# Patient Record
Sex: FEMALE | Race: WHITE
[De-identification: names, ages, dates, MRNs, and addresses within clinical notes are randomized per-mention and may not be internally consistent; named-entity substitution may affect disease eponyms.]

---

## 2017-06-30 ENCOUNTER — HOSPITAL ENCOUNTER (EMERGENCY)
Dept: HOSPITAL 33 - ED | Age: 37
Discharge: LEFT BEFORE DISCHARGE | End: 2017-06-30
Payer: SELF-PAY

## 2017-06-30 VITALS — OXYGEN SATURATION: 100 %

## 2017-06-30 VITALS — DIASTOLIC BLOOD PRESSURE: 76 MMHG | SYSTOLIC BLOOD PRESSURE: 138 MMHG | HEART RATE: 92 BPM

## 2017-06-30 DIAGNOSIS — R07.89: Primary | ICD-10-CM

## 2017-06-30 DIAGNOSIS — E78.00: ICD-10-CM

## 2017-06-30 DIAGNOSIS — Z79.84: ICD-10-CM

## 2017-06-30 DIAGNOSIS — R20.0: ICD-10-CM

## 2017-06-30 DIAGNOSIS — F45.8: ICD-10-CM

## 2017-06-30 DIAGNOSIS — E11.9: ICD-10-CM

## 2017-06-30 LAB
ALBUMIN SERPL-MCNC: 3.8 G/DL (ref 3.4–5)
ALP SERPL-CCNC: 87 U/L (ref 46–116)
ALT SERPL-CCNC: 48 U/L (ref 12–78)
ANION GAP SERPL CALC-SCNC: 19 MEQ/L (ref 5–15)
ARTERIAL PATENCY WRIST A: YES
AST SERPL QL: 65 U/L (ref 15–37)
BASE EXCESS BLDA CALC-SCNC: -0.6 MMOL/L (ref -2–2)
BASOPHILS NFR BLD AUTO: 0.4 % (ref 0–0.4)
BILIRUB BLD-MCNC: 0.5 MG/DL (ref 0.2–1)
BUN SERPL-MCNC: 11 MG/DL (ref 9–20)
CHLORIDE SERPL-SCNC: 103 MEQ/L (ref 98–107)
CO2 SERPL-SCNC: 19.8 MEQ/L (ref 21–32)
GLUCOSE SERPL-MCNC: 272 MG/DL (ref 70–110)
INHALED O2 CONCENTRATION: 21 %
INR PPP: 0.97 (ref 0.8–3)
MCH RBC QN AUTO: 30.8 PG (ref 26–32)
NEUTROPHILS NFR BLD AUTO: 59.8 % (ref 36–66)
O2 A-A PPRESDIFF RESPIRATORY: 5 MM[HG]
PLATELET # BLD AUTO: 218 K/MM3 (ref 150–450)
PO2 BLDA: 120 MMHG (ref 75–100)
POTASSIUM SERPLBLD-SCNC: 3.8 MEQ/L (ref 3.5–5.1)
PROT SERPL-MCNC: 7.8 GM/DL (ref 6.4–8.2)
PROTHROMBIN TIME: 11 SECONDS (ref 9.95–12.35)
RBC # BLD AUTO: 4.87 M/MM3 (ref 4.1–5.4)
SAO2 % BLDA: 99.8 % (ref 95–100)
SODIUM SERPL-SCNC: 138 MEQ/L (ref 136–145)
WBC # BLD AUTO: 8 K/MM3 (ref 4–10.5)

## 2017-06-30 PROCEDURE — 71010: CPT

## 2017-06-30 PROCEDURE — 96375 TX/PRO/DX INJ NEW DRUG ADDON: CPT

## 2017-06-30 PROCEDURE — 99284 EMERGENCY DEPT VISIT MOD MDM: CPT

## 2017-06-30 PROCEDURE — 82803 BLOOD GASES ANY COMBINATION: CPT

## 2017-06-30 PROCEDURE — 93041 RHYTHM ECG TRACING: CPT

## 2017-06-30 PROCEDURE — 96374 THER/PROPH/DIAG INJ IV PUSH: CPT

## 2017-06-30 PROCEDURE — 36600 WITHDRAWAL OF ARTERIAL BLOOD: CPT

## 2017-06-30 PROCEDURE — 82375 ASSAY CARBOXYHB QUANT: CPT

## 2017-06-30 PROCEDURE — 36415 COLL VENOUS BLD VENIPUNCTURE: CPT

## 2017-06-30 PROCEDURE — 93005 ELECTROCARDIOGRAM TRACING: CPT

## 2017-06-30 PROCEDURE — 85610 PROTHROMBIN TIME: CPT

## 2017-06-30 PROCEDURE — 84484 ASSAY OF TROPONIN QUANT: CPT

## 2017-06-30 PROCEDURE — 96376 TX/PRO/DX INJ SAME DRUG ADON: CPT

## 2017-06-30 PROCEDURE — 36000 PLACE NEEDLE IN VEIN: CPT

## 2017-06-30 PROCEDURE — 85379 FIBRIN DEGRADATION QUANT: CPT

## 2017-06-30 PROCEDURE — 96360 HYDRATION IV INFUSION INIT: CPT

## 2017-06-30 PROCEDURE — 85025 COMPLETE CBC W/AUTO DIFF WBC: CPT

## 2017-06-30 PROCEDURE — 80053 COMPREHEN METABOLIC PANEL: CPT

## 2017-06-30 NOTE — ERPHSYRPT
- History of Present Illness


Time Seen by Provider: 06/30/17 02:35


Historian: patient


Exam Limitations: clinical condition


Physician History: 





PATIENT WITH HISTORY OF TYPE 2 DIABETES COMPLAINS SHARP PAINS IN CHEST AFTER 

AWAKENING FROM SLEEP ASSOCIATED WITH DIFFICULTY  AND RAPID BREATHING. STATES 

PAIN WORSE UPON INSPIRATION AND MOTION OF TORSO. ADMITSTO LIFTING HEAVY BOXES 

AT WORK SUSTAINING CHEST PAIN EARLLING IN THE DAY. HAS CHRONIC NUMBNESS IN BOTH 

HANDS, AND NEW NUMBNESS AROUND LIPS, FINGERS AND TOES. DENIES FEVER, CHILLS.


Timing/Duration: today


Activities at Onset: none


Quality: sharpness


Location: substernal


Chest Pain Radiation: no radiation


Severity of Pain-Max: moderate


Severity of Pain-Current: moderate


Modifying Factors: Improves With: breathing, change in position


Associated Symptoms: hurts to breathe


Nitro Today/Relief: no nitro taken today


Aspirin Treatment Today: 81 mg x 4, provided by ED


Allergies/Adverse Reactions: 








tomato Allergy (Verified 06/30/17 03:00)


 





Home Medications: 








Metformin HCl [Metformin HCl ER] 500 mg PO BID 12/21/16 [History]





Hx Tetanus, Diphtheria Vaccination/Date Given: Yes


Hx Influenza Vaccination/Date Given: No


Hx Pneumococcal Vaccination/Date Given: No





- Review of Systems


Constitutional: No Fever, No Chills


Eyes: No Symptoms


Ears, Nose, & Throat: No Symptoms


Respiratory: Dyspnea, No Cough


Cardiac: Chest Pain, No Edema, No Syncope


Abdominal/Gastrointestinal: No Symptoms, No Abdominal Pain, No Nausea, No 

Vomiting, No Diarrhea


Genitourinary Symptoms: No Symptoms, No Dysuria


Musculoskeletal: No Symptoms, No Back Pain, No Neck Pain


Skin: No Symptoms, No Rash


Neurological: Parasthesia, No Dizziness, No Focal Weakness, No Sensory Changes


Psychological: No Symptoms


Endocrine: No Symptoms


All Other Systems: Reviewed and Negative





- Past Medical History


Pertinent Past Medical History: Yes


Neurological History: No Pertinent History


ENT History: No Pertinent History


Cardiac History: High Cholesterol


Endocrine Medical History: Diabetes Type II


Musculoskeletal History: No Pertinent History


GI Medical History: No Pertinent History


 History: No Pertinent History


Psycho-Social History: No Pertinent History


Female Reproductive Disorders: No Pertinent History


Other Medical History: CHRONIC HIP PAIN





- Past Surgical History


Past Surgical History: Yes


Neuro Surgical History: No Pertinent History


Respiratory: No Pertinent History


Musculoskeletal: No Pertinent History


Female Surgical History: Tubal Ligation


Other Surgical History: oral surgery





- Social History


Smoking Status: Current every day smoker


How long have you smoked: 20 years


Exposure to second hand smoke: Yes


Drug Use: marijuana


Patient Lives Alone: No


Significant Family History: hypertension (mother)





- Female History


Hx Pregnant Now: No





- Nursing Vital Signs


Nursing Vital Signs: 


 Initial Vital Signs











Temperature                    98.2 F


 


Temperature Source             Oral


 


Pulse Rate                     87


 


Respiratory Rate               18


 


Blood Pressure [Right Arm]     134/76


 


Pain Intensity                 3

















- Physical Exam


General Appearance: mild distress, alert, other (HYPERVENTILATION)


Eye Exam: PERRL/EOMI, eyes nml inspection


Ears, Nose, Throat Exam: normal ENT inspection, moist mucous membranes


Neck Exam: normal inspection, non-tender, supple, full range of motion


Respiratory Exam: normal breath sounds, chest tenderness (PARASTERNAL 

TENDERNESS T-3 TO T-5), lungs clear, No respiratory distress


Cardiovascular Exam: regular rate/rhythm, normal heart sounds


Gastrointestinal/Abdomen Exam: soft, No tenderness, No mass


Back Exam: normal inspection, No CVA tenderness, No vertebral tenderness


Extremity Exam: normal inspection, normal range of motion


Neurologic Exam: alert, oriented x 3, cooperative, normal mood/affect, 

sensation nml, No motor deficits


Skin Exam: normal color, warm, dry


**SpO2 Interpretation**: normal


SpO2: 100


Oxygen Delivery: Room Air





- Course


EKG Interpreted by Me: RATE, NORMAL AXIS, 1st degree AV Block (RATE OF 98)





- Radiology Exams


  ** Chest


X-ray Interpretation: Interpreted by me, Negative, No Infiltrates


Ordered Tests: 


 Active Orders 24 hr











 Category Date Time Status


 


 Cardiac Monitor STAT Care  06/30/17 02:34 Active


 


 EKG-ER Only STAT Care  06/30/17 02:34 Active


 


 IV Insertion STAT Care  06/30/17 02:34 Active


 


 Oxygen-ED Only NASAL CANNULA 2 lpm Care  06/30/17 02:34 Active


 


 CHEST 1 VIEW (PORTABLE) Stat Exams  06/30/17 03:38 Taken


 


 ARTERIAL BLOOD GASES Stat Lab  06/30/17 02:33 Completed


 


 CBC W DIFF Stat Lab  06/30/17 02:40 Completed


 


 CMP Stat Lab  06/30/17 02:40 Completed


 


 D-DIMER QUANTITATION Stat Lab  06/30/17 02:40 Completed


 


 PROTIME WITH INR Stat Lab  06/30/17 02:40 Completed


 


 TROPONIN Q3H Lab  06/30/17 02:40 Completed


 


 TROPONIN Q3H Lab  06/30/17 05:45 Ordered


 


 TROPONIN Q3H Lab  06/30/17 08:45 Ordered


 


 TROPONIN Q3H Lab  06/30/17 11:45 Ordered


 


 TROPONIN Q3H Lab  06/30/17 14:45 Ordered








Medication Summary











Generic Name Dose Route Start Last Admin





  Trade Name Avis  PRN Reason Stop Dose Admin


 


Sodium Chloride  1,000 mls @ 100 mls/hr  06/30/17 02:45  06/30/17 02:56





  Sodium Chloride 0.9% 1000 Ml  IV  07/30/17 02:44  100 mls/hr





  .Q10H ROSLYN   Administration














Discontinued Medications














Generic Name Dose Route Start Last Admin





  Trade Name Avis  PRN Reason Stop Dose Admin


 


Aspirin  324 mg  06/30/17 02:34  06/30/17 02:55





  Baby Aspirin 81 Mg Chew***  PO  06/30/17 02:35  324 mg





  STAT ONE   Administration


 


Aspirin  Confirm  06/30/17 02:54  





  Baby Aspirin 81 Mg Chew***  Administered  06/30/17 02:55  





  Dose   





  324 mg   





  .ROUTE   





  .STK-MED ONE   


 


Fentanyl Citrate  100 mcg  06/30/17 03:31  06/30/17 03:35





  Sublimaze 100 Mcg/2 Ml***  IV  06/30/17 03:32  100 mcg





  STAT ONE   Administration


 


Fentanyl Citrate  Confirm  06/30/17 03:33  





  Sublimaze 100 Mcg/2 Ml***  Administered  06/30/17 03:34  





  Dose   





  100 mcg   





  .ROUTE   





  .STK-MED ONE   


 


Ketorolac Tromethamine  30 mg  06/30/17 03:31  06/30/17 03:37





  Toradol 30 Mg Injection***  IV  06/30/17 03:32  30 mg





  STAT ONE   Administration


 


Ketorolac Tromethamine  Confirm  06/30/17 03:33  





  Toradol 30 Mg Injection***  Administered  06/30/17 03:34  





  Dose   





  30 mg   





  .ROUTE   





  .STK-MED ONE   


 


Lorazepam  2 mg  06/30/17 03:14  06/30/17 03:22





  Ativan 2 Mg/1 Ml Vial***  IV  06/30/17 03:15  2 mg





  STAT ONE   Administration


 


Lorazepam  Confirm  06/30/17 03:20  





  Ativan 2 Mg/1 Ml Vial***  Administered  06/30/17 03:21  





  Dose   





  2 mg   





  .ROUTE   





  .STK-MED ONE   


 


Nitroglycerin  0.4 mg  06/30/17 02:34  06/30/17 02:56





  Nitrostat 0.4 Mg (Ed)***  SL  06/30/17 02:35  0.4 mg





  STAT ONE   Administration


 


Nitroglycerin  Confirm  06/30/17 02:54  





  Nitrostat 0.4 Mg (Ed)***  Administered  06/30/17 02:55  





  Dose   





  0.4 mg   





  SL   





  .STK-MED ONE   


 


Nitroglycerin  1 gm  06/30/17 03:49  





  Nitro-Bid 2% Ud Packets***  TOP  06/30/17 03:50  





  STAT ONE   


 


Ondansetron HCl  4 mg  06/30/17 03:31  06/30/17 03:47





  Zofran 4 Mg/2 Ml Vial**  IV  06/30/17 03:32  4 mg





  STAT ONE   Administration


 


Ondansetron HCl  Confirm  06/30/17 03:47  





  Zofran 4 Mg/2 Ml Vial**  Administered  06/30/17 03:48  





  Dose   





  4 mg   





  .ROUTE   





  .STK-MED ONE   











Lab/Rad Data: 


 Laboratory Result Diagrams





 06/30/17 02:40 





 06/30/17 02:40 





 Laboratory Results











  06/30/17 06/30/17 06/30/17 Range/Units





  02:40 02:40 02:40 


 


WBC     (4.0-10.5)  K/mm3


 


RBC     (4.1-5.4)  M/mm3


 


Hgb     (12.0-16.0)  gm/dl


 


Hct     (35-47)  %


 


MCV     ()  fl


 


MCH     (26-32)  pg


 


MCHC     (32-36)  g/dl


 


RDW     (11.5-14.0)  %


 


Plt Count     (150-450)  K/mm3


 


MPV     (6-9.5)  fl


 


Gran %     (36.0-66.0)  %


 


Lymphocytes %     (24.0-44.0)  %


 


Monocytes %     (0.0-12.0)  %


 


Eosinophils %     (0.00-5.0)  %


 


Basophils %     (0.0-0.4)  %


 


Basophils #     (0-0.4)  


 


INR   0.97   (0.8-3.0)  


 


D-Dimer   387   (0-500)  ng/mL


 


Puncture Site     


 


pCO2     (35-45)  mmHg


 


pO2     ()  mmHg


 


Base Excess     (-2.0-2.0)  


 


O2 Saturation     ()  g/dF


 


ABG pH     (7.35-7.45)  


 


ABG HCO3     (22-28)  


 


ABG O2 Sat (Measured)     ()  %


 


Perez Test     


 


A-a Gradient     


 


a/A Ratio     


 


Hemoglobin     


 


Carboxyhemoglobin     (0.0-6.9)  % THgb


 


Methemoglobin     (1.4-1.5)  %


 


Potassium    3.8  (3.5-5.1)  


 


Temperature     C


 


POC O2 Flow Rate     %


 


Sodium    138  (136-145)  mEq/L


 


Chloride    103  ()  mEq/L


 


Carbon Dioxide    19.8 L  (21-32)  mEq/L


 


Anion Gap    19.0 H  (5-15)  MEQ/L


 


BUN    11  (9-20)  mg/dL


 


Creatinine    0.82  (0.55-1.30)  mg/dl


 


Estimated GFR    > 60  ML/MIN


 


Glucose    272 H  ()  MG/DL


 


Calcium    9.2  (8.5-10.1)  mg/dL


 


Total Bilirubin    0.50  (0.2-1.0)  mg/dL


 


AST    65 H  (15-37)  U/L


 


ALT    48  (12-78)  U/L


 


Alkaline Phosphatase    87  ()  U/L


 


Troponin I  < 0.017    (0.000-0.056)  ng/ml


 


Serum Total Protein    7.8  (6.4-8.2)  gm/dL


 


Albumin    3.8  (3.4-5.0)  g/dL














  06/30/17 06/30/17 Range/Units





  02:40 02:33 


 


WBC  8.0   (4.0-10.5)  K/mm3


 


RBC  4.87   (4.1-5.4)  M/mm3


 


Hgb  15.0   (12.0-16.0)  gm/dl


 


Hct  43.4   (35-47)  %


 


MCV  89.1   ()  fl


 


MCH  30.8   (26-32)  pg


 


MCHC  34.6   (32-36)  g/dl


 


RDW  12.7   (11.5-14.0)  %


 


Plt Count  218   (150-450)  K/mm3


 


MPV  9.4   (6-9.5)  fl


 


Gran %  59.8   (36.0-66.0)  %


 


Lymphocytes %  31.2   (24.0-44.0)  %


 


Monocytes %  7.0   (0.0-12.0)  %


 


Eosinophils %  1.6   (0.00-5.0)  %


 


Basophils %  0.4   (0.0-0.4)  %


 


Basophils #  0.03   (0-0.4)  


 


INR    (0.8-3.0)  


 


D-Dimer    (0-500)  ng/mL


 


Puncture Site   RIGHT RADIAL  


 


pCO2   20 L*  (35-45)  mmHg


 


pO2   120 H  ()  mmHg


 


Base Excess   -0.6  (-2.0-2.0)  


 


O2 Saturation   95.6  ()  g/dF


 


ABG pH   7.58 H*  (7.35-7.45)  


 


ABG HCO3   18.8 L  (22-28)  


 


ABG O2 Sat (Measured)   99.8  ()  %


 


Perez Test   YES  


 


A-a Gradient   5  


 


a/A Ratio   0.96  


 


Hemoglobin   14.8  


 


Carboxyhemoglobin   3.1  (0.0-6.9)  % THgb


 


Methemoglobin   1.1 L  (1.4-1.5)  %


 


Potassium   3.8  (3.5-5.1)  


 


Temperature   37.0  C


 


POC O2 Flow Rate   21  %


 


Sodium    (136-145)  mEq/L


 


Chloride    ()  mEq/L


 


Carbon Dioxide    (21-32)  mEq/L


 


Anion Gap    (5-15)  MEQ/L


 


BUN    (9-20)  mg/dL


 


Creatinine    (0.55-1.30)  mg/dl


 


Estimated GFR    ML/MIN


 


Glucose    ()  MG/DL


 


Calcium    (8.5-10.1)  mg/dL


 


Total Bilirubin    (0.2-1.0)  mg/dL


 


AST    (15-37)  U/L


 


ALT    (12-78)  U/L


 


Alkaline Phosphatase    ()  U/L


 


Troponin I    (0.000-0.056)  ng/ml


 


Serum Total Protein    (6.4-8.2)  gm/dL


 


Albumin    (3.4-5.0)  g/dL














- Progress


Progress: improved, re-examined


Progress Note: 





06/30/17 03:2PATIENT GIVEN IV ATIVAN 2MG, 4 BABY ASPIRIN, NITROGLYCERIN 0.4MG SL


06/30/17 03:55


STATES HER PAIN IS MARKEDLY IMPROVED, REFUSES ADMISSION, RISK VS BENEFIT 

DISCUSSED WITH PATIENT. PATIENT SIGNS AMA.


Counseled pt/family regarding: lab results, diagnosis, need for follow-up





- Departure


Time of Disposition: 04:00


Departure Disposition: In-patient Admission


Clinical Impression: 


 ACUTE CHEST PAIN, HYPERVENTIATIO SYNDROME





Condition: Stable


Critical Care Time: No


Referrals: 


TYLER RAMOS [Primary Care Provider] - 


Additional Instructions: 


TORADOL 10MG EVERY 6 HOURS FOR PAIN AS NEEDED. RETURN TO EMERGENCY FOR 

INCREASING PAIN DISCOMFORT. FOLLOWUP WITH YOUR FAMILY PHYSICIAN IN 4-5 DAYS.


Prescriptions: 


Ketorolac Tromethamine [Toradol] 10 mg PO Q6H PRN PRN #20 tablet


 PRN Reason: Pain

## 2017-06-30 NOTE — XRAY
Indication: Dyspnea.



Comparison: October 3, 2013.



Portable chest remains slightly underinflated and clear.  Heart is not

enlarged.  Vascularity normal.  Bony thorax intact.



Impression: Stable nonacute chest.

## 2017-10-17 ENCOUNTER — HOSPITAL ENCOUNTER (OUTPATIENT)
Dept: HOSPITAL 33 - ED | Age: 37
Setting detail: OBSERVATION
LOS: 1 days | Discharge: HOME | End: 2017-10-18
Attending: GENERAL PRACTICE | Admitting: GENERAL PRACTICE
Payer: COMMERCIAL

## 2017-10-17 DIAGNOSIS — R11.10: ICD-10-CM

## 2017-10-17 DIAGNOSIS — E11.9: ICD-10-CM

## 2017-10-17 DIAGNOSIS — Z79.4: ICD-10-CM

## 2017-10-17 DIAGNOSIS — L02.31: Primary | ICD-10-CM

## 2017-10-17 LAB
ANION GAP SERPL CALC-SCNC: 13.7 MEQ/L (ref 5–15)
BASOPHILS NFR BLD AUTO: 0.5 % (ref 0–0.4)
BUN SERPL-MCNC: 6 MG/DL (ref 9–20)
CHLORIDE SERPL-SCNC: 101 MEQ/L (ref 98–107)
CO2 SERPL-SCNC: 25 MEQ/L (ref 21–32)
GLUCOSE SERPL-MCNC: 212 MG/DL (ref 70–110)
MCH RBC QN AUTO: 29.6 PG (ref 26–32)
NEUTROPHILS NFR BLD AUTO: 61.4 % (ref 36–66)
PLATELET # BLD AUTO: 199 K/MM3 (ref 150–450)
POTASSIUM SERPLBLD-SCNC: 3.7 MEQ/L (ref 3.5–5.1)
RBC # BLD AUTO: 4.6 M/MM3 (ref 4.1–5.4)
SODIUM SERPL-SCNC: 136 MEQ/L (ref 136–145)
WBC # BLD AUTO: 6.3 K/MM3 (ref 4–10.5)

## 2017-10-17 PROCEDURE — 87040 BLOOD CULTURE FOR BACTERIA: CPT

## 2017-10-17 PROCEDURE — 87077 CULTURE AEROBIC IDENTIFY: CPT

## 2017-10-17 PROCEDURE — 36000 PLACE NEEDLE IN VEIN: CPT

## 2017-10-17 PROCEDURE — 93268 ECG RECORD/REVIEW: CPT

## 2017-10-17 PROCEDURE — 00400 ANES INTEGUMENTARY SYS NOS: CPT

## 2017-10-17 PROCEDURE — 96365 THER/PROPH/DIAG IV INF INIT: CPT

## 2017-10-17 PROCEDURE — 99285 EMERGENCY DEPT VISIT HI MDM: CPT

## 2017-10-17 PROCEDURE — 82962 GLUCOSE BLOOD TEST: CPT

## 2017-10-17 PROCEDURE — 80053 COMPREHEN METABOLIC PANEL: CPT

## 2017-10-17 PROCEDURE — 93005 ELECTROCARDIOGRAM TRACING: CPT

## 2017-10-17 PROCEDURE — 80202 ASSAY OF VANCOMYCIN: CPT

## 2017-10-17 PROCEDURE — 36415 COLL VENOUS BLD VENIPUNCTURE: CPT

## 2017-10-17 PROCEDURE — 88304 TISSUE EXAM BY PATHOLOGIST: CPT

## 2017-10-17 PROCEDURE — 96374 THER/PROPH/DIAG INJ IV PUSH: CPT

## 2017-10-17 PROCEDURE — 96375 TX/PRO/DX INJ NEW DRUG ADDON: CPT

## 2017-10-17 PROCEDURE — 80048 BASIC METABOLIC PNL TOTAL CA: CPT

## 2017-10-17 PROCEDURE — 87070 CULTURE OTHR SPECIMN AEROBIC: CPT

## 2017-10-17 PROCEDURE — 85025 COMPLETE CBC W/AUTO DIFF WBC: CPT

## 2017-10-17 PROCEDURE — 83036 HEMOGLOBIN GLYCOSYLATED A1C: CPT

## 2017-10-17 PROCEDURE — 0HB8XZZ EXCISION OF BUTTOCK SKIN, EXTERNAL APPROACH: ICD-10-PCS | Performed by: SURGERY

## 2017-10-17 RX ADMIN — SODIUM CHLORIDE SCH MLS/HR: 9 INJECTION, SOLUTION INTRAVENOUS at 23:45

## 2017-10-17 NOTE — PCM.HP
History of Present Illness





- Chief Complaint


Chief Complaint: abscess right buttock


History of Present Illness: 


 is a 37 year old female pt of Dr. John who was admitted through 

ER this morning for a large abscess on her R buttock.  It started last night 

with some soreness.  She has had 2 previous abscesses in the same spot, one of 

which required hospitalization and she was scheduled for an I&D when it 

ruptured.  She reports a remote hx of MRSA.  





- Review of Systems


Constitutional: Other (weight gain 30 lb), No Fever


Cardiac: Edema (chronic, LE)


Abdominal/Gastrointestinal: Appetite Changes (no appetite since last night)


Skin: Other (abscess R buttock)


Psychological: No Anxiety, No Depression, No Suicidal Ideations





Medications & Allergies


Home Medications: 


 Home Medication List





Metformin HCl [Metformin HCl ER] 500 mg PO BID 12/21/16 [History Confirmed 10/17

/17]








Allergies/Adverse Reactions: 


 Allergies











Allergy/AdvReac Type Severity Reaction Status Date / Time


 


tomato Allergy   Verified 10/17/17 13:58














- Past Medical History


Past Medical History: Yes


Neurological History: No Pertinent History


ENT History: No Pertinent History


Cardiac History: High Cholesterol


Respiratory History: No Pertinent History


Endocrine Medical History: Diabetes Type II


Musculoskelatal History: No Pertinent History


GI Medical History: No Pertinent History


 History: No Pertinent History


Pyscho-Social History: No Pertinent History


Reproductive Disorders: No Pertinent History


Comment: CHRONIC HIP PAIN





- Female History


Hx Last Menstrual Period: currently menstruating


Are you pregnant now?: No





- Past Surgical History


Past Surgical History: Yes


Neuro Surgical History: No Pertinent History


Cardiac History: No Pertinent History


Respiratory Surgery: No Pertinent History


GI Surgical History: Appendectomy


Musculskeletal Surgical Hx: No Pertinent History


Female Surgical History: Tubal Ligation


Other Surgical History: oral surgery, ear drum cauterization,





- Social History


Smoking Status: Current every day smoker


How long have you smoked: 23 years


Exposure to second hand smoke: Yes


Alcohol: Rarely


Drug Use: marijuana


Significant Family History: hypertension (mother)





- Physical Exam


Vital Signs: 


 Vital Signs - 24 hr











  Temp Pulse Resp BP Pulse Ox


 


 10/17/17 13:17  98.1 F  84  18  128/66  97


 


 10/17/17 12:49  98.1 F  70  16  132/81  97


 


 10/17/17 12:11  97.9 F  82  18  129/73  94 L


 


 10/17/17 12:09      98


 


 10/17/17 10:25  97.8 F  92 H  18  138/77  98











General Appearance: no apparent distress, alert


Neurologic Exam: oriented x 3, cooperative


Eye Exam: eyes nml inspection


Neck Exam: normal inspection, non-tender, supple, No lymphadenopathy


Respiratory Exam: normal breath sounds, lungs clear, No crackles/rales, No 

rhonchi, No wheezing


Cardiovascular Exam: regular rate/rhythm, normal heart sounds, No murmur


Gastrointestinal/Abdomen Exam: soft, No tenderness


Skin Exam: warm, dry, other (R medial buttock, inferior, with approx 4x6cm 

erythematous enlarged area with surrounding induration of approx [id number].  

exquisitely tender.)





Assessment/Plan


(1) Abscess of buttock, right


Current Visit: Yes   Status: Acute   


Assessment & Plan: 


Surgery to I&D, thank you.  On vancomycin IV.


Code(s): L02.31 - CUTANEOUS ABSCESS OF BUTTOCK   





(2) Type 2 diabetes mellitus


Current Visit: No   Status: Acute   


Qualifiers: 


   Diabetes mellitus complication status: without complication   Diabetes 

mellitus long term insulin use: without long term use   Qualified Code(s): 

E11.9 - Type 2 diabetes mellitus without complications   


Assessment & Plan: 


will check BS ac/hs.

## 2017-10-17 NOTE — ERPHSYRPT
- History of Present Illness


Time Seen by Provider: 10/17/17 10:36


Source: patient


Exam Limitations: no limitations


Patient Subjective Stated Complaint: Pt states "I have another abscess on my 

butt.  I have had three and they have never went away.  The last one busted on 

its own but this one is really tender and swollen"


Triage Nursing Assessment: Pt alert and oriented X 3, skin pwd. pt ambulates 

with a waddleing gait.  Pt able to speak in full clear sentencs.


Physician History: 





37-year-old white female who states she's had several abscesses on her buttocks 

for several months complains of abscess in her buttockswhich is enlarged and 

painful symptoms for several days.


Patient states she has had these abscesses before she feels like they have 

never really gone away.








Past medical history includes hypercholesterolemia, diabetes, chronic hip pain





Past surgical history includes tubal ligation, oral surgery


Timing/Duration: day(s) (several days)


Severity: moderate


Modifying Factors: Improves With: nothing


Associated Symptoms: other (large abscess on buttocks), No nausea, No vomiting, 

No abdominal pain, No shortness of breath, No heartburn, No diaphoresis, No 

cough, No chills, No chest pain, No fever, No headaches, No loss of appetite, 

No malaise, No rash, No syncope, No seizure


Allergies/Adverse Reactions: 








tomato Allergy (Verified 06/30/17 03:00)


 





Home Medications: 








Metformin HCl [Metformin HCl ER] 500 mg PO BID 12/21/16 [History]





Hx Tetanus, Diphtheria Vaccination/Date Given: Yes


Hx Influenza Vaccination/Date Given: No


Hx Pneumococcal Vaccination/Date Given: No


Immunizations Up to Date: Yes





- Review of Systems


Constitutional: No Fever, No Chills


Eyes: No Symptoms


Ears, Nose, & Throat: No Symptoms


Respiratory: No Cough, No Dyspnea


Cardiac: No Chest Pain, No Edema, No Syncope


Abdominal/Gastrointestinal: No Abdominal Pain, No Nausea, No Vomiting, No 

Diarrhea


Genitourinary Symptoms: No Dysuria


Musculoskeletal: No Back Pain, No Neck Pain


Skin: Other (large abscess on buttocks)


Neurological: No Dizziness, No Focal Weakness, No Sensory Changes


Psychological: No Symptoms


Endocrine: No Symptoms


All Other Systems: Reviewed and Negative





- Past Medical History


Pertinent Past Medical History: Yes


Neurological History: No Pertinent History


ENT History: No Pertinent History


Cardiac History: High Cholesterol


Endocrine Medical History: Diabetes Type II


Musculoskeletal History: No Pertinent History


GI Medical History: No Pertinent History


 History: No Pertinent History


Psycho-Social History: No Pertinent History


Female Reproductive Disorders: No Pertinent History


Other Medical History: CHRONIC HIP PAIN





- Past Surgical History


Past Surgical History: Yes


Neuro Surgical History: No Pertinent History


Respiratory: No Pertinent History


Musculoskeletal: No Pertinent History


Female Surgical History: Tubal Ligation


Other Surgical History: oral surgery





- Social History


Smoking Status: Current every day smoker


How long have you smoked: 23 years


Exposure to second hand smoke: Yes


Drug Use: marijuana


Patient Lives Alone: No


Significant Family History: hypertension (mother)





- Female History


Hx Last Menstrual Period: 10/13/2017


Hx Pregnant Now: No





- Nursing Vital Signs


Nursing Vital Signs: 


 Initial Vital Signs











Temperature  97.8 F   10/17/17 10:25


 


Pulse Rate  92 H  10/17/17 10:25


 


Respiratory Rate  18   10/17/17 10:25


 


Blood Pressure  138/77   10/17/17 10:25


 


O2 Sat by Pulse Oximetry  98   10/17/17 10:25








 Pain Scale











Pain Intensity                 4

















- Physical Exam


General Appearance: no apparent distress, alert


Eye Exam: PERRL/EOMI, eyes nml inspection


Ears, Nose, Throat Exam: normal ENT inspection, TMs normal, pharynx normal, 

moist mucous membranes


Neck Exam: normal inspection, non-tender, supple, full range of motion


Respiratory Exam: normal breath sounds, lungs clear, No respiratory distress


Cardiovascular Exam: regular rate/rhythm, normal heart sounds, normal 

peripheral pulses


Gastrointestinal/Abdomen Exam: soft, normal bowel sounds, No tenderness, No mass


Extremity Exam: normal inspection, normal range of motion, pelvis stable, other 

(patient with 4-5 cm abscess firm area right buttocks exquisitly tender to 

palpation)


Neurologic Exam: alert, oriented x 3, cooperative, normal mood/affect, nml 

cerebellar function, nml station & gait, sensation nml, No motor deficits


Skin Exam: other (4-5 cm abscess right buttock exquisitley tender to palpation)


**SpO2 Interpretation**: normal (98%)


SpO2: 98


Oxygen Delivery: Room Air





- Course


Nursing assessment & vital signs reviewed: Yes


Ordered Tests: 


 Active Orders 24 hr











 Category Date Time Status


 


 IV Insertion STAT Care  10/17/17 11:36 Active


 


 BLOOD CULTURE Stat Lab  10/17/17 12:04 Ordered


 


 BMP Stat Lab  10/17/17 11:50 Received


 


 CBC W DIFF Stat Lab  10/17/17 11:50 Received








Medication Summary











Generic Name Dose Route Start Last Admin





  Trade Name Freq  PRN Reason Stop Dose Admin


 


Vancomycin HCl  250 mls @ 167 mls/hr  10/17/17 12:06  





  Vancomycin 1gm/ Ns 250ml***  IV  10/17/17 13:35  





  STAT ONE   














Discontinued Medications














Generic Name Dose Route Start Last Admin





  Trade Name Freq  PRN Reason Stop Dose Admin


 


Morphine Sulfate  4 mg  10/17/17 12:06  





  Morphine Sulfate 4 Mg Inj***  IV  10/17/17 12:07  





  STAT ONE   


 


Ondansetron HCl  4 mg  10/17/17 12:06  





  Zofran 4 Mg/2 Ml Vial**  IV  10/17/17 12:07  





  STAT ONE   














- Progress


Progress: improved


Progress Note: 





10/17/17 12:07


37-year-old white female with history of diabetes states that she's had an 

abscess on her right buttock symptoms going on chronically for several months 

however the last few days this is become markedly enlarged and painful.





Patient is exquisitely tender on the buttocks on the right with a large abscess 

approximately 5 cm.








I've discussed the case with Dr. Diane will place patient on vancomycin give 

patient a shot of morphine.


Plan to admit on the floor and obtain surgery consult.








- Departure


Time of Disposition: 12:09


Departure Disposition: Observation


Clinical Impression: 


 Abscess of right buttock





Condition: Fair


Critical Care Time: No


Referrals: 


TYLER RAMOS [Primary Care Provider] -

## 2017-10-18 VITALS — HEART RATE: 84 BPM | DIASTOLIC BLOOD PRESSURE: 68 MMHG | SYSTOLIC BLOOD PRESSURE: 135 MMHG

## 2017-10-18 VITALS — OXYGEN SATURATION: 97 %

## 2017-10-18 LAB
ALBUMIN SERPL-MCNC: 2.9 G/DL (ref 3.4–5)
ALP SERPL-CCNC: 67 U/L (ref 46–116)
ALT SERPL-CCNC: 38 U/L (ref 12–78)
ANION GAP SERPL CALC-SCNC: 11.1 MEQ/L (ref 5–15)
AST SERPL QL: 47 U/L (ref 15–37)
BASOPHILS NFR BLD AUTO: 0.3 % (ref 0–0.4)
BILIRUB BLD-MCNC: 0.5 MG/DL (ref 0.2–1)
BUN SERPL-MCNC: 5 MG/DL (ref 9–20)
CHLORIDE SERPL-SCNC: 104 MEQ/L (ref 98–107)
CO2 SERPL-SCNC: 26.8 MEQ/L (ref 21–32)
GLUCOSE SERPL-MCNC: 233 MG/DL (ref 70–110)
MCH RBC QN AUTO: 29.7 PG (ref 26–32)
NEUTROPHILS NFR BLD AUTO: 69.3 % (ref 36–66)
PLATELET # BLD AUTO: 173 K/MM3 (ref 150–450)
POTASSIUM SERPLBLD-SCNC: 4.1 MEQ/L (ref 3.5–5.1)
PROT SERPL-MCNC: 6.1 GM/DL (ref 6.4–8.2)
RBC # BLD AUTO: 4.04 M/MM3 (ref 4.1–5.4)
SODIUM SERPL-SCNC: 138 MEQ/L (ref 136–145)
WBC # BLD AUTO: 8.7 K/MM3 (ref 4–10.5)

## 2017-10-18 RX ADMIN — SODIUM CHLORIDE SCH MLS/HR: 9 INJECTION, SOLUTION INTRAVENOUS at 05:13

## 2017-10-18 RX ADMIN — SODIUM CHLORIDE SCH MLS/HR: 9 INJECTION, SOLUTION INTRAVENOUS at 14:25

## 2017-10-18 RX ADMIN — MORPHINE SULFATE PRN MG: 2 INJECTION, SOLUTION INTRAMUSCULAR; INTRAVENOUS at 01:14

## 2017-10-18 RX ADMIN — MORPHINE SULFATE PRN MG: 2 INJECTION, SOLUTION INTRAMUSCULAR; INTRAVENOUS at 07:02

## 2017-10-18 NOTE — PCM.NOTE
Date and Time: 10/18/17  0833





Subjective Assessment: 





She is feeling much better.  Did vomit after breakfast this morning.





Objective Exam


General Appearance: no apparent distress, alert, anxiety


Neurologic Exam: cooperative


Skin Exam: other (R buttock with surgical wound approx 3x4 cm. decreased 

erythema; induration approx of total approx 5cm)


Respiratory Exam: normal breath sounds, lungs clear, No crackles/rales, No 

rhonchi, No wheezing


Cardiovascular Exam: regular rate/rhythm, normal heart sounds, No murmur





OBJECTIVE DATA


Vital Signs: 


 Vital Signs - 24 hr











  Temp Pulse Resp BP Pulse Ox


 


 10/18/17 07:34  98.4 F  84  16  130/84  94 L


 


 10/18/17 04:00  98.5 F  83  16  115/59  95


 


 10/18/17 00:15  97.8 F  90  16  142/65  97


 


 10/17/17 23:45   89   106/54  93 L


 


 10/17/17 23:15   86   116/59  94 L


 


 10/17/17 23:00   84   118/57  93 L


 


 10/17/17 22:45   93 H   140/71  94 L


 


 10/17/17 22:30  98.5 F  91 H  18  139/70  97


 


 10/17/17 22:15  98.3 F  91 H  16  134/70  97


 


 10/17/17 20:00  98.1 F  82  17  122/68  97


 


 10/17/17 17:15  98.8 F  84  20  127/66  94 L


 


 10/17/17 16:00  98.8 F  84  20  127/66  94 L


 


 10/17/17 13:17  98.1 F  84  18  128/66  97


 


 10/17/17 12:49  98.1 F  70  16  132/81  97


 


 10/17/17 12:11  97.9 F  82  18  129/73  94 L


 


 10/17/17 12:09      98


 


 10/17/17 10:25  97.8 F  92 H  18  138/77  98








 Pain Assessment - Last Documented











Pain Intensity                 4


 


Pain Scale Used                0-10 Pain Scale











Intake and Output: 


 Intake & Output











 10/15/17 10/16/17 10/17/17 10/18/17





 11:59 11:59 11:59 11:59


 


Intake Total    360


 


Output Total    100


 


Balance    260


 


Weight    118.132 kg











Lab Results: 


 Accuchecks











Date                           10/18/17


 


Date                           10/17/17


 


Time                           07:30


 


Time                           22:30


 


Accucheck Value:               200


 


Accucheck Value:               242


 


Accucheck Value:               175











 Lab Results-Last 24 Hours











  10/18/17 10/18/17 Range/Units





  05:08 05:08 


 


WBC  8.7   (4.0-10.5)  K/mm3


 


RBC  4.04 L   (4.1-5.4)  M/mm3


 


Hgb  12.0   (12.0-16.0)  gm/dl


 


Hct  36.9   (35-47)  %


 


MCV  91.3   ()  fl


 


MCH  29.7   (26-32)  pg


 


MCHC  32.5   (32-36)  g/dl


 


RDW  12.4   (11.5-14.0)  %


 


Plt Count  173   (150-450)  K/mm3


 


MPV  9.3   (6-9.5)  fl


 


Gran %  69.3 H   (36.0-66.0)  %


 


Lymphocytes %  22.8 L   (24.0-44.0)  %


 


Monocytes %  6.1   (0.0-12.0)  %


 


Eosinophils %  1.5   (0.00-5.0)  %


 


Basophils %  0.3   (0.0-0.4)  %


 


Basophils #  0.03   (0-0.4)  


 


Sodium   138  (136-145)  mEq/L


 


Potassium   4.1  (3.5-5.1)  mEq/L


 


Chloride   104  ()  mEq/L


 


Carbon Dioxide   26.8  (21-32)  mEq/L


 


Anion Gap   11.1  (5-15)  MEQ/L


 


BUN   5 L  (9-20)  mg/dL


 


Creatinine   0.59  (0.55-1.30)  mg/dl


 


Estimated GFR   > 60  ML/MIN


 


Glucose   233 H  ()  MG/DL


 


Calcium   8.0 L  (8.5-10.1)  mg/dL


 


Total Bilirubin   0.50  (0.2-1.0)  mg/dL


 


AST   47 H  (15-37)  U/L


 


ALT   38  (12-78)  U/L


 


Alkaline Phosphatase   67  ()  U/L


 


Serum Total Protein   6.1 L  (6.4-8.2)  gm/dL


 


Albumin   2.9 L  (3.4-5.0)  g/dL











Multi-Disciplinary Progress Notes: 


 Multi-Disciplinary Progress Notes





10/17/17 14:46 Pharmacy Note by INTERN,PHARM


10/17: Pharmacy to dose vancomycin. 36 yo pt has abscess on buttox along with 

history of abscesses that have not healed. Wt 256lbs SCr 0.61 & CrCl 124ml/min. 

Loading dose 2grams & maintenance 1.5g q8h to produce a predicted peak of 27.5 

mcg/ml and a predicted trough of 13.63 mcg/ml. Trough level to be checked 10/18 

@1330. Thank you!


Shayy, pharmacy intern





Initialized on 10/17/17 14:46 - END OF NOTE

















Assessment/Plan


(1) Abscess of buttock, right


Current Visit: Yes   Status: Acute   


Assessment & Plan: 


Looks great after I&D, thank you, and erythema and induration much improved.  

On vancomycin, has remote hx MRSA.


Code(s): L02.31 - CUTANEOUS ABSCESS OF BUTTOCK   





(2) Type 2 diabetes mellitus


Current Visit: No   Status: Acute   


Qualifiers: 


   Diabetes mellitus complication status: without complication   Diabetes 

mellitus long term insulin use: without long term use   Qualified Code(s): 

E11.9 - Type 2 diabetes mellitus without complications   





(3) Vomiting


Current Visit: Yes   Status: Acute   


Qualifiers: 


   Vomiting type: unspecified   Vomiting Intractability: non-intractable   

Nausea presence: with nausea   Qualified Code(s): R11.2 - Nausea with vomiting, 

unspecified   


Assessment & Plan: 


Will keep her and advance diet slowly.


Code(s): R11.10 - VOMITING, UNSPECIFIED

## 2017-10-18 NOTE — OP
SURGERY DATE/TIME:    10/17/2017  2031



PREOPERATIVE DIAGNOSIS:    Recurrent right buttock/perirectal area infection versus 
ruptured cyst site. 



POSTOPERATIVE DIAGNOSIS:     Ruptured cyst/sinus tract site with underlying abscess.



PROCEDURE:    Excisional biopsy of ruptured cyst/sinus tract site approximately 3 cm (with 
drainage of underlying abscess with irrigation, culture and packing).



SURGEON:        Dr. Thomas Saldaña.



ANESTHESIA:    General.



ESTIMATED BLOOD LOSS:    Minimal.    



INDICATIONS:  As noted above. Risks and benefits explained in detail and not limited to 
and consent obtained.

     

DESCRIPTION OF PROCEDURE AND FINDINGS: The patient is taken to the operating room. General 
anesthesia was introduced. She was placed in lateral position right side down. Prepped and 
draped in usual sterile fashion. After official time out and no disagreement with planned 
procedure, she had a thin portion of skin here. Dissection carried down. What appeared to 
be either a ruptured cyst or sinus tract area this was excised. She had some foul 
purulence around this and it was cultured, drained, irrigated out. The ruptured cyst site 
was excised this was about 3 cm in size. There did not appear to be any evidence of any 
residual ruptured cyst or sinus tract at this point. There was no obvious direct 
communication towards the rectum although the patient had been counseled that this could 
be a variation of a perirectal infection and could develop fistula in ano. She understood 
the initial treatment as open the wound. At this point there appeared to be either cyst or 
sinus tract that was excised and passed off for pathology. Copious amount of irrigation 
irrigating until clear. Some pinpoint cautery was used. There was no specific vessel to 
cauterize or ligate at this point. A small piece of Surgicel was placed as well as some 
wet to dry saline soaked gauze. The patient tolerated the procedure well. There were no 
immediate complications. Findings discussed with the family out in the waiting area. 
Continue her antibiotics and local wound care. When she is able to be discharged by her 
medical physician she can follow up in the office in a couple of weeks otherwise continue 
wet to dry on a daily basis unless the wound care therapy had any other recommendations.

## 2017-10-18 NOTE — PCM.DS
Discharge Summary


Date of Admission: 


10/17/17 12:59





Admitting Physician: 


TYLER RAMOS





Primary Care Provider: 


TYLER RAMOS








Allergies


Allergies





tomato Allergy (Verified 10/17/17 13:58)


 











Hospital Summary





- Hospital Course


Hospital Course: 





Pt admitted with 1 day history of abscess on R buttock.  Drained by surgery, 

thank you.  Her pain is much less, culture is pending, and she is ready to go 

home. Denis po fine.





- Vitals & Intake/Output


Vital Signs: 





 Vital Signs











Temperature  98.1 F   10/18/17 11:14


 


Pulse Rate  82   10/18/17 11:14


 


Respiratory Rate  18   10/18/17 11:14


 


Blood Pressure  103/58   10/18/17 11:14


 


O2 Sat by Pulse Oximetry  97   10/18/17 11:14











Intake & Output: 





 Intake & Output











 10/16/17 10/17/17 10/18/17 10/19/17





 11:59 11:59 11:59 11:59


 


Intake Total   360 360


 


Output Total   100 


 


Balance   260 360


 


Weight   118.132 kg 














- Lab


Result Diagrams: 


 10/18/17 05:08





 10/18/17 05:08


Lab Results-Last 24 Hrs: 





 Accuchecks











Date                           10/18/17


 


Date                           10/18/17


 


Date                           10/17/17


 


Time                           11:30


 


Time                           07:30


 


Time                           22:30


 


Accucheck Value:               188


 


Accucheck Value:               200


 


Accucheck Value:               242


 


Accucheck Value:               175











 Lab Results-Last 24 Hours











  10/18/17 10/18/17 10/18/17 Range/Units





  05:08 05:08 13:22 


 


WBC  8.7    (4.0-10.5)  K/mm3


 


RBC  4.04 L    (4.1-5.4)  M/mm3


 


Hgb  12.0    (12.0-16.0)  gm/dl


 


Hct  36.9    (35-47)  %


 


MCV  91.3    ()  fl


 


MCH  29.7    (26-32)  pg


 


MCHC  32.5    (32-36)  g/dl


 


RDW  12.4    (11.5-14.0)  %


 


Plt Count  173    (150-450)  K/mm3


 


MPV  9.3    (6-9.5)  fl


 


Gran %  69.3 H    (36.0-66.0)  %


 


Lymphocytes %  22.8 L    (24.0-44.0)  %


 


Monocytes %  6.1    (0.0-12.0)  %


 


Eosinophils %  1.5    (0.00-5.0)  %


 


Basophils %  0.3    (0.0-0.4)  %


 


Basophils #  0.03    (0-0.4)  


 


Sodium   138   (136-145)  mEq/L


 


Potassium   4.1   (3.5-5.1)  mEq/L


 


Chloride   104   ()  mEq/L


 


Carbon Dioxide   26.8   (21-32)  mEq/L


 


Anion Gap   11.1   (5-15)  MEQ/L


 


BUN   5 L   (9-20)  mg/dL


 


Creatinine   0.59   (0.55-1.30)  mg/dl


 


Estimated GFR   > 60   ML/MIN


 


Glucose   233 H   ()  MG/DL


 


Calcium   8.0 L   (8.5-10.1)  mg/dL


 


Total Bilirubin   0.50   (0.2-1.0)  mg/dL


 


AST   47 H   (15-37)  U/L


 


ALT   38   (12-78)  U/L


 


Alkaline Phosphatase   67   ()  U/L


 


Serum Total Protein   6.1 L   (6.4-8.2)  gm/dL


 


Albumin   2.9 L   (3.4-5.0)  g/dL


 


Vancomycin Trough    8.0 L  (10-20)  UG/ML











Micro Results-Entire Visit: 





 Accuchecks











Date                           10/18/17


 


Date                           10/18/17


 


Date                           10/17/17


 


Time                           11:30


 


Time                           07:30


 


Time                           22:30


 


Accucheck Value:               188


 


Accucheck Value:               200


 


Accucheck Value:               242


 


Accucheck Value:               175

















- Procedures and Test


Procedures and Tests throughout Hospitalization: 





 Therapy Orders & Screens





10/17/17 19:39


EKG ROUTINE 


   Comment: 


   Diagnosis: abscess right buttock














Discharge Exam


General Appearance: no apparent distress, alert


Neurologic Exam: oriented x 3, cooperative


Skin Exam: warm, dry, other (R buttock wrapped this afternoon; nttp (this 

morning was decreased in size, erythema, and induration))


Respiratory Exam: normal breath sounds, lungs clear, No crackles/rales, No 

rhonchi, No wheezing


Cardiovascular Exam: regular rate/rhythm, normal heart sounds, No murmur


Extremity Exam: No pedal edema, No swelling





Final Diagnosis/Problem List





- Final Discharge Diagnosis/Problem


(1) Abscess of buttock, right


Current Visit: Yes   Status: Acute   


Assessment & Plan: 


Home on po bactrim.  f/u in 1 wk in office. Off work x 5d (until Monday).








(2) Type 2 diabetes mellitus


Current Visit: No   Status: Chronic   





(3) Vomiting


Current Visit: Yes   Status: Resolved   





- Discharge


Disposition: Home, Self-Care


Condition: Stable


Prescriptions: 


New


   Sulfamethoxazole/Trimethoprim [Bactrim Ds Tablet] 1 each PO BID #20 tablet





Continue


   Metformin HCl [Metformin HCl ER] 500 mg PO BID


Follow up with: 


EMMY HEDRICK [COURTESY STAFF] - 10/30/17 8:50 am ([facility])


ASAEL LION [ACTIVE STAFF] - 10/25/17 11:00 am

## 2017-10-18 NOTE — CONS
CONSULT DATE:    10/17/2017



HISTORY:  The patient is a 37 year-old patient of Dr. Roxi richards. She has diabetes. 
She has had two infections on her buttock - perirectal area in the past. The last time 
drained on its own improved on its own. She has pain and swelling for a few days. She 
presented and admitted to the hospital on IV antibiotic and asked for surgery consult. 



PAST MEDICAL HISTORY:  Hip pain, hypercholesterolemia.



PAST SURGICAL HISTORY: Tubal in the past. Oral surgery. She has poor dentition. 



MEDICATIONS:  Metformin. 



ALLERGIES:  TOMATO.



FAMILY HISTORY:  Hypertension.



SOCIAL HISTORY:  Every day smoker and also uses marijuana. 



PAST SURGICAL HISTORY:  



REVIEW OF SYSTEMS:  Twelve systems reviewed. No chest pain or palpitations other systems 
negative or noncontributory as above and per preadmission assessment. 



PHYSICAL EXAMINATION:  

GENERAL:  No acute distress.

HEENT:  Sclera nonicteric.

NECK:  No JVD.

CHEST:  Equal excursion, nonlabored breathing.

CVS:  Regular rhythm.

ABDOMEN:  Soft. No peritoneal signs. She is obese.   

EXTREMITIES:  No significant edema.  

NEURO:  Alert, moving extremities symmetrically.  

SKIN: In the buttock area there is a raised painful fluctuant area whether this is 
perirectal or true buttock abscess is unclear. Anyway I feel she would benefit from 
drainage and possible debridement. 



IMPRESSION:  A 37 year-old diabetic with infection right buttock and/or perirectal area. I 
felt she would benefit from drainage and possible debridement. Risks and benefits 
explained in detail but not limited to bleeding or infection, possibility that this could 
be developing fistula in ano that might require other treatment or even referral to a 
specialist down the road. General risk of anesthesia, deep venous thrombosis, pulmonary 
embolism, pneumonia, risk of aches and pains, possible need for further treatments or 
debridement in the future as well a possible need for packing and antibiotics.  She 
understands all the above but not limited to as well as general risk of anesthesia, deep 
venous thrombosis, pulmonary embolism, pneumonia, aches and pains but not limited to and 
will proceed with drainage, possible debridement right buttock and/or perirectal area when 
OR time available.

## 2018-05-07 ENCOUNTER — HOSPITAL ENCOUNTER (EMERGENCY)
Dept: HOSPITAL 33 - ED | Age: 38
Discharge: HOME | End: 2018-05-07
Payer: COMMERCIAL

## 2018-05-07 VITALS — DIASTOLIC BLOOD PRESSURE: 75 MMHG | HEART RATE: 92 BPM | SYSTOLIC BLOOD PRESSURE: 105 MMHG

## 2018-05-07 VITALS — OXYGEN SATURATION: 97 %

## 2018-05-07 DIAGNOSIS — Z79.84: ICD-10-CM

## 2018-05-07 DIAGNOSIS — N30.01: Primary | ICD-10-CM

## 2018-05-07 DIAGNOSIS — R11.0: ICD-10-CM

## 2018-05-07 DIAGNOSIS — E11.9: ICD-10-CM

## 2018-05-07 DIAGNOSIS — R10.9: ICD-10-CM

## 2018-05-07 LAB
ALBUMIN SERPL-MCNC: 4.4 G/DL (ref 3.5–5)
ALP SERPL-CCNC: 97 U/L (ref 38–126)
ALT SERPL-CCNC: 22 U/L (ref 0–35)
AMPHETAMINES UR QL: NEGATIVE
ANION GAP SERPL CALC-SCNC: 19.7 MEQ/L (ref 5–15)
AST SERPL QL: 24 U/L (ref 14–36)
BARBITURATES UR QL: NEGATIVE
BENZODIAZ UR QL SCN: NEGATIVE
BILIRUB BLD-MCNC: 0.9 MG/DL (ref 0.2–1.3)
BUN SERPL-MCNC: 8 MG/DL (ref 7–17)
CALCIUM SPEC-MCNC: 9.7 MG/DL (ref 8.4–10.2)
CHLORIDE SERPL-SCNC: 100 MMOL/L (ref 98–107)
CO2 SERPL-SCNC: 20 MMOL/L (ref 22–30)
COCAINE UR QL SCN: NEGATIVE
CREAT SERPL-MCNC: 0.46 MG/DL (ref 0.52–1.04)
GLUCOSE SERPL-MCNC: 287 MG/DL (ref 74–106)
GLUCOSE UR-MCNC: 1000 MG/DL
LIPASE SERPL-CCNC: 145 U/L (ref 23–300)
METHADONE UR QL: NEGATIVE
OPIATES UR QL: NEGATIVE
PCP UR QL CFM>20 NG/ML: NEGATIVE
POTASSIUM SERPLBLD-SCNC: 4.8 MMOL/L (ref 3.5–5.1)
PROT SERPL-MCNC: 7.4 G/DL (ref 6.3–8.2)
PROT UR STRIP-MCNC: 300 MG/DL
SODIUM SERPL-SCNC: 135 MMOL/L (ref 137–145)
THC UR QL SCN: POSITIVE
WBC URNS QL MICRO: >100 /HPF (ref 0–5)

## 2018-05-07 PROCEDURE — 99284 EMERGENCY DEPT VISIT MOD MDM: CPT

## 2018-05-07 PROCEDURE — 85025 COMPLETE CBC W/AUTO DIFF WBC: CPT

## 2018-05-07 PROCEDURE — 96375 TX/PRO/DX INJ NEW DRUG ADDON: CPT

## 2018-05-07 PROCEDURE — 84703 CHORIONIC GONADOTROPIN ASSAY: CPT

## 2018-05-07 PROCEDURE — 96374 THER/PROPH/DIAG INJ IV PUSH: CPT

## 2018-05-07 PROCEDURE — 36415 COLL VENOUS BLD VENIPUNCTURE: CPT

## 2018-05-07 PROCEDURE — 87086 URINE CULTURE/COLONY COUNT: CPT

## 2018-05-07 PROCEDURE — 87186 SC STD MICRODIL/AGAR DIL: CPT

## 2018-05-07 PROCEDURE — 83690 ASSAY OF LIPASE: CPT

## 2018-05-07 PROCEDURE — 80053 COMPREHEN METABOLIC PANEL: CPT

## 2018-05-07 PROCEDURE — 85379 FIBRIN DEGRADATION QUANT: CPT

## 2018-05-07 PROCEDURE — 81000 URINALYSIS NONAUTO W/SCOPE: CPT

## 2018-05-07 PROCEDURE — 74176 CT ABD & PELVIS W/O CONTRAST: CPT

## 2018-05-07 PROCEDURE — 80307 DRUG TEST PRSMV CHEM ANLYZR: CPT

## 2018-05-07 PROCEDURE — 96360 HYDRATION IV INFUSION INIT: CPT

## 2018-05-07 PROCEDURE — 87077 CULTURE AEROBIC IDENTIFY: CPT

## 2018-05-07 PROCEDURE — 36000 PLACE NEEDLE IN VEIN: CPT

## 2018-05-07 NOTE — ERPHSYRPT
- History of Present Illness


Time Seen by Provider: 05/07/18 11:15


Historian: patient


Exam Limitations: no limitations


Patient Subjective Stated Complaint: here for abd pain for 3 days with nausea


Triage Nursing Assessment: pt alert, walked in, resp easy,skin w/d/p. and soft


Physician History: 





mild to mod diffuse abdominal ache today, rad to back, off and on, +nausea, no 

fever, no injury


Allergies/Adverse Reactions: 








tomato Allergy (Verified 05/07/18 11:13)


 





Home Medications: 








Metformin HCl [Metformin HCl ER] 500 mg PO BID 12/21/16 [History]





Hx Tetanus, Diphtheria Vaccination/Date Given: Yes


Hx Influenza Vaccination/Date Given: No


Hx Pneumococcal Vaccination/Date Given: No


Immunizations Up to Date: Yes





- Review of Systems


Constitutional: No Fever


Eyes: No Vision Changes


Ears, Nose, & Throat: No Mouth Pain


Respiratory: No Dyspnea


Cardiac: No Chest Pain


Abdominal/Gastrointestinal: Abdominal Pain, Nausea, No Vomiting


Genitourinary Symptoms: No Dysuria


Musculoskeletal: Back Pain


Skin: No Rash


Neurological: No Dizziness





- Past Medical History


Pertinent Past Medical History: Yes


Neurological History: No Pertinent History


ENT History: No Pertinent History


Cardiac History: High Cholesterol


Respiratory History: No Pertinent History


Endocrine Medical History: Diabetes Type II


Musculoskeletal History: Other


GI Medical History: No Pertinent History


 History: No Pertinent History


Psycho-Social History: No Pertinent History


Female Reproductive Disorders: No Pertinent History


Other Medical History: PT. IS A SMOKER NAD HAS HX CHRONIC HIP PN.  PSH: 

APPENDECTOMY, TUBAL LIGATION, AND ORAL SURGERY; HX MARIJUANA USE





- Past Surgical History


Past Surgical History: Yes


Neuro Surgical History: No Pertinent History


Cardiac: No Pertinent History


Respiratory: No Pertinent History


Gastrointestinal: Appendectomy


Genitourinary: No Pertinent History


Musculoskeletal: No Pertinent History


Female Surgical History: Tubal Ligation


Other Surgical History: oral surgery, ear drum cauterization,





- Social History


Smoking Status: Current every day smoker


How long have you smoked: 23 years


Exposure to second hand smoke: Yes


Drug Use: marijuana


Patient Lives Alone: No


Significant Family History: hypertension (mother)





- Female History


Hx Last Menstrual Period: april


Hx Pregnant Now: No





- Nursing Vital Signs


Nursing Vital Signs: 


 Initial Vital Signs











Temperature  97.2 F   05/07/18 11:09


 


Pulse Rate  116 H  05/07/18 11:09


 


Respiratory Rate  18   05/07/18 11:09


 


Blood Pressure  142/94   05/07/18 11:09


 


O2 Sat by Pulse Oximetry  97   05/07/18 11:09








 Pain Scale











Pain Intensity                 2

















- Physical Exam


General Appearance: no apparent distress


Eye Exam: eyes nml inspection


Ears, Nose, Throat Exam: moist mucous membranes


Neck Exam: normal inspection


Respiratory Exam: normal breath sounds


Cardiovascular Exam: regular rate/rhythm, normal heart sounds


Gastrointestinal/Abdomen Exam: soft, tenderness, No rebound


Back Exam: normal inspection


Extremity Exam: normal inspection


Neurologic Exam: alert, oriented x 3, cooperative


Skin Exam: normal color, warm, dry


**SpO2 Interpretation**: normal


SpO2: 97


Oxygen Delivery: Room Air





- Course


Nursing assessment & vital signs reviewed: Yes





- CT Exams


  ** Abdomen/Pelvis


CT Interpretation: Discussed w/radiologist, Other (no obstruction or 

appendicitis)


Ordered Tests: 


 Active Orders 24 hr











 Category Date Time Status


 


 IV Insertion STAT Care  05/07/18 11:13 Active


 


 ABDOMEN AND PELVIS W/0 CONTRAS [CT] Stat Exams  05/07/18 13:05 Completed


 


 CBC W DIFF Stat Lab  05/07/18 11:40 Received


 


 CMP Stat Lab  05/07/18 11:40 Completed


 


 CULTURE,URINE Stat Lab  05/07/18 11:40 Received


 


 D-DIMER QUANTITATION Stat Lab  05/07/18 11:40 Completed


 


 HCG QUALITATIVE,SERUM Stat Lab  05/07/18 11:40 Completed


 


 LIPASE Stat Lab  05/07/18 11:40 Completed


 


 UA W/ MICROSCOPIC Stat Lab  05/07/18 11:40 Completed


 


 Urine Triage Profile Stat Lab  05/07/18 11:40 Completed








Medication Summary














Discontinued Medications














Generic Name Dose Route Start Last Admin





  Trade Name Joaquimq  PRN Reason Stop Dose Admin


 


Sodium Chloride  1,000 mls @ 999 mls/hr  05/07/18 11:13  05/07/18 11:29





  Sodium Chloride 0.9% 1000 Ml  IV  05/07/18 12:13  999 mls/hr





  .Q1H1M STA   Administration


 


Sodium Chloride  Confirm  05/07/18 11:24  





  Sodium Chloride 0.9% 1000 Ml  Administered  05/07/18 11:25  





  Dose   





  1,000 mls @ ud   





  .ROUTE   





  .STK-MED ONE   


 


Ketorolac Tromethamine  30 mg  05/07/18 11:13  05/07/18 11:29





  Toradol 30 Mg Injection***  IV  05/07/18 11:14  30 mg





  STAT ONE   Administration


 


Ketorolac Tromethamine  Confirm  05/07/18 11:24  





  Toradol 30 Mg Injection***  Administered  05/07/18 11:25  





  Dose   





  30 mg   





  .ROUTE   





  .STK-MED ONE   


 


Ondansetron HCl  4 mg  05/07/18 11:13  05/07/18 11:30





  Zofran 4 Mg/2 Ml Vial**  IV  05/07/18 11:14  4 mg





  STAT ONE   Administration


 


Ondansetron HCl  Confirm  05/07/18 11:24  





  Zofran 4 Mg/2 Ml Vial**  Administered  05/07/18 11:25  





  Dose   





  4 mg   





  .ROUTE   





  .STK-MED ONE   


 


Pantoprazole Sodium  40 mg  05/07/18 11:13  05/07/18 11:29





  Protonix 40 Mg Iv***  IV  05/07/18 11:14  40 mg





  STAT ONE   Administration


 


Pantoprazole Sodium  Confirm  05/07/18 11:24  





  Protonix 40 Mg Iv***  Administered  05/07/18 11:25  





  Dose   





  40 mg   





  IV   





  .STK-[facility] ONE   











Lab/Rad Data: 


 Laboratory Result Diagrams





 05/07/18 11:40 





 Laboratory Results











  05/07/18 05/07/18 05/07/18 Range/Units





  11:40 11:40 11:40 


 


D-Dimer   339.41   (215-500)  ng/mL


 


Sodium     (137-145)  mmol/L


 


Potassium     (3.5-5.1)  mmol/L


 


Chloride     ()  mmol/L


 


Carbon Dioxide     (22-30)  mmol/L


 


Anion Gap     (5-15)  MEQ/L


 


BUN     (7-17)  mg/dL


 


Creatinine     (0.52-1.04)  mg/dL


 


Estimated GFR     ML/MIN


 


Glucose     ()  mg/dL


 


Calcium     (8.4-10.2)  mg/dL


 


Total Bilirubin     (0.2-1.3)  mg/dL


 


AST     (14-36)  U/L


 


ALT     (0-35)  U/L


 


Alkaline Phosphatase     ()  U/L


 


Serum Total Protein     (6.3-8.2)  g/dL


 


Albumin     (3.5-5.0)  g/dL


 


Lipase     ()  U/L


 


Serum Pregnancy, Qual    NEGATIVE  (Negative)  


 


Ur Collection Type     


 


Urine Color     (YELLOW)  


 


Urine Appearance     (CLEAR)  


 


Urine pH     (5-6)  


 


Ur Specific Gravity     (1.005-1.025)  


 


Urine Protein     (Negative)  


 


Urine Ketones     (NEGATIVE)  


 


Urine Blood     (0-5)  Yandel/ul


 


Urine Nitrite     (NEGATIVE)  


 


Urine Bilirubin     (NEGATIVE)  


 


Urine Urobilinogen     (0-1)  mg/dL


 


Ur Leukocyte Esterase     (NEGATIVE)  


 


Urine Microscopic WBC     (0-5)  /HPF


 


Ur Epithelial Cells     (FEW)  /HPF


 


Urine Bacteria     (NEGATIVE)  /HPF


 


Urine Culture Reflexed     (NO)  


 


Urine Glucose     (NEGATIVE)  mg/dL


 


Urine Opiates Level  NEGATIVE    (NEGATIVE)  


 


Ur Methadone  NEGATIVE    (NEGATIVE)  


 


Urine Barbiturates  NEGATIVE    (NEGATIVE)  


 


Ur Phencyclidine (PCP)  NEGATIVE    (NEGATIVE)  


 


Urine Amphetamine  NEGATIVE    (NEGATIVE)  


 


U Benzodiazepine Level  NEGATIVE    (NEGATIVE)  


 


Urine Cocaine  NEGATIVE    (NEGATIVE)  


 


Urine Marijuana (THC)  POSITIVE    (NEGATIVE)  


 


Specimen Received     














  05/07/18 05/07/18 Range/Units





  11:40 11:40 


 


D-Dimer    (215-500)  ng/mL


 


Sodium   135 L  (137-145)  mmol/L


 


Potassium   4.8  (3.5-5.1)  mmol/L


 


Chloride   100  ()  mmol/L


 


Carbon Dioxide   20 L  (22-30)  mmol/L


 


Anion Gap   19.7 H  (5-15)  MEQ/L


 


BUN   8  (7-17)  mg/dL


 


Creatinine   0.46 L  (0.52-1.04)  mg/dL


 


Estimated GFR   > 60.0  ML/MIN


 


Glucose   287 H  ()  mg/dL


 


Calcium   9.7  (8.4-10.2)  mg/dL


 


Total Bilirubin   0.90  (0.2-1.3)  mg/dL


 


AST   24  (14-36)  U/L


 


ALT   22  (0-35)  U/L


 


Alkaline Phosphatase   97  ()  U/L


 


Serum Total Protein   7.4  (6.3-8.2)  g/dL


 


Albumin   4.4  (3.5-5.0)  g/dL


 


Lipase   145  ()  U/L


 


Serum Pregnancy, Qual    (Negative)  


 


Ur Collection Type  CLEAN CATCH   


 


Urine Color  WALTER   (YELLOW)  


 


Urine Appearance  CLOUDY   (CLEAR)  


 


Urine pH  5.0   (5-6)  


 


Ur Specific Gravity  1.020   (1.005-1.025)  


 


Urine Protein  300   (Negative)  


 


Urine Ketones  SMALL   (NEGATIVE)  


 


Urine Blood  250   (0-5)  Yandel/ul


 


Urine Nitrite  POSITIVE   (NEGATIVE)  


 


Urine Bilirubin  NEGATIVE   (NEGATIVE)  


 


Urine Urobilinogen  NORMAL   (0-1)  mg/dL


 


Ur Leukocyte Esterase  2+   (NEGATIVE)  


 


Urine Microscopic WBC  >100   (0-5)  /HPF


 


Ur Epithelial Cells  FEW   (FEW)  /HPF


 


Urine Bacteria  MODERATE   (NEGATIVE)  /HPF


 


Urine Culture Reflexed  YES   (NO)  


 


Urine Glucose  1000   (NEGATIVE)  mg/dL


 


Urine Opiates Level    (NEGATIVE)  


 


Ur Methadone    (NEGATIVE)  


 


Urine Barbiturates    (NEGATIVE)  


 


Ur Phencyclidine (PCP)    (NEGATIVE)  


 


Urine Amphetamine    (NEGATIVE)  


 


U Benzodiazepine Level    (NEGATIVE)  


 


Urine Cocaine    (NEGATIVE)  


 


Urine Marijuana (THC)    (NEGATIVE)  


 


Specimen Received  05/07/18 1200   














- Progress


Progress: improved


Counseled pt/family regarding: lab results, diagnosis, need for follow-up, rad 

results





- Departure


Time of Disposition: 14:54


Departure Disposition: Home


Clinical Impression: 


UTI (urinary tract infection)


Qualifiers:


 Urinary tract infection type: acute cystitis Hematuria presence: with 

hematuria Qualified Code(s): N30.01 - Acute cystitis with hematuria





Condition: Stable


Critical Care Time: No


Instructions:  Acute Abdomen (Belly Pain), Adult (DC)


Additional Instructions: 


keflex, motrin, oral fluids, see your doctor, return if worse, differential d/w 

pt as early diverticulitis, occult renal stone, early pyelonephritis

## 2018-10-22 ENCOUNTER — HOSPITAL ENCOUNTER (EMERGENCY)
Dept: HOSPITAL 33 - ED | Age: 38
Discharge: HOME | End: 2018-10-22
Payer: COMMERCIAL

## 2018-10-22 VITALS — DIASTOLIC BLOOD PRESSURE: 115 MMHG | OXYGEN SATURATION: 98 % | HEART RATE: 86 BPM | SYSTOLIC BLOOD PRESSURE: 164 MMHG

## 2018-10-22 DIAGNOSIS — E11.9: ICD-10-CM

## 2018-10-22 DIAGNOSIS — K04.7: Primary | ICD-10-CM

## 2018-10-22 DIAGNOSIS — Z72.0: ICD-10-CM

## 2018-10-22 DIAGNOSIS — F12.90: ICD-10-CM

## 2018-10-22 DIAGNOSIS — E78.00: ICD-10-CM

## 2018-10-22 DIAGNOSIS — K08.89: ICD-10-CM

## 2018-10-22 DIAGNOSIS — Z79.4: ICD-10-CM

## 2018-10-22 PROCEDURE — 99283 EMERGENCY DEPT VISIT LOW MDM: CPT

## 2018-10-22 RX ADMIN — LIDOCAINE HYDROCHLORIDE ONE ML: 20 SOLUTION ORAL; TOPICAL at 11:22

## 2018-10-22 RX ADMIN — CLINDAMYCIN HYDROCHLORIDE ONE MG: 150 CAPSULE ORAL at 11:22

## 2018-10-22 NOTE — ERPHSYRPT
- History of Present Illness


Time Seen by Provider: 10/22/18 11:16


Source: patient


Patient Subjective Stated Complaint: patient comes in with an abscess tooth, 

states she has had them before and the pain started yesterday.


Triage Nursing Assessment: Patient is alert and oriented female with history of 

diabetes, high cholesterol and previous abscess requiring airlifting for 

treatment due to throat swelling. pain is rated at a 3-8 depending on how she 

moves.


Physician History: 





mild to mod ache lower left tooth for 2 days, no injury, no fever, speech fluent


Allergies/Adverse Reactions: 








tomato Allergy (Verified 05/07/18 11:13)


 





Home Medications: 








Metformin HCl [Metformin HCl ER] 500 mg PO BID 12/21/16 [History]





Hx Tetanus, Diphtheria Vaccination/Date Given: No


Hx Influenza Vaccination/Date Given: No


Hx Pneumococcal Vaccination/Date Given: No


Immunizations Up to Date: Yes





- Review of Systems


Constitutional: No Fever


Eyes: No Tearing


Ears, Nose, & Throat: No Painful Swallowing


Respiratory: No Dyspnea


Neurological: No Dizziness





- Past Medical History


Pertinent Past Medical History: Yes


Neurological History: No Pertinent History


ENT History: No Pertinent History


Cardiac History: High Cholesterol


Respiratory History: No Pertinent History


Endocrine Medical History: Diabetes Type II


Musculoskeletal History: Other


GI Medical History: No Pertinent History


 History: No Pertinent History


Psycho-Social History: No Pertinent History


Female Reproductive Disorders: No Pertinent History


Other Medical History: PT. IS A SMOKER NAD HAS HX CHRONIC HIP PN. TUBAL LIGATION

, AND ORAL SURGERY; HX MARIJUANA USE





- Past Surgical History


Past Surgical History: Yes


Neuro Surgical History: No Pertinent History


Cardiac: No Pertinent History


Respiratory: No Pertinent History


Gastrointestinal: No Pertinent History


Genitourinary: No Pertinent History


Musculoskeletal: No Pertinent History


Female Surgical History: Tubal Ligation


Other Surgical History: oral surgery, ear drum cauterization,





- Social History


Smoking Status: Current every day smoker


How long have you smoked: 22 yrs


Exposure to second hand smoke: Yes


Drug Use: marijuana


Patient Lives Alone: No


Significant Family History: hypertension (mother)





- Female History


Hx Pregnant Now: No





- Nursing Vital Signs


Nursing Vital Signs: 





 Initial Vital Signs











Temperature  98.6 F   10/22/18 10:54


 


Pulse Rate  86   10/22/18 10:54


 


Respiratory Rate  20   10/22/18 10:54


 


Blood Pressure  164/115   10/22/18 10:54


 


O2 Sat by Pulse Oximetry  98   10/22/18 10:54








 Pain Scale











Pain Intensity                 8

















- Physical Exam


General Appearance: no apparent distress


Ears, Nose, Throat Exam: other (tender lower left molar, +erythema, no fluc mass

, no trismus)


Respiratory Exam: No respiratory distress


SpO2: 98


Oxygen Delivery: Room Air





- Course


Nursing assessment & vital signs reviewed: Yes





- Progress


Progress: improved


Counseled pt/family regarding: diagnosis, need for follow-up





- Departure


Time of Disposition: 11:18


Departure Disposition: Home


Clinical Impression: 


 Dental infection





Condition: Stable


Critical Care Time: No


Referrals: 


DOCTOR,NO FAMILY [Primary Care Provider] - 


Instructions:  Tooth Decay, Adult (DC)


Additional Instructions: 


viscus lidocaine, cleocin, see a dentist, return if worse


Prescriptions: 


Clindamycin HCl 150 mg PO TID 10 Days #30 capsule

## 2018-11-18 ENCOUNTER — HOSPITAL ENCOUNTER (EMERGENCY)
Dept: HOSPITAL 33 - ED | Age: 38
Discharge: HOME | End: 2018-11-18
Payer: COMMERCIAL

## 2018-11-18 VITALS — HEART RATE: 86 BPM | SYSTOLIC BLOOD PRESSURE: 140 MMHG | DIASTOLIC BLOOD PRESSURE: 90 MMHG | OXYGEN SATURATION: 98 %

## 2018-11-18 DIAGNOSIS — K02.9: Primary | ICD-10-CM

## 2018-11-18 PROCEDURE — 99283 EMERGENCY DEPT VISIT LOW MDM: CPT

## 2018-11-18 NOTE — ERPHSYRPT
- History of Present Illness


Time Seen by Provider: 11/18/18 12:10


Source: patient


Exam Limitations: clinical condition


Patient Subjective Stated Complaint: pt here for a toothache for over a month, 

and cant get into dentist, co pain and swelling to botttom left jaw


Triage Nursing Assessment: pt alert, walked in, resp easy, skin w/d/p. has 

multi dental caries and has slight swelling to left jaw


Physician History: 





PATIENT WITH A HISTORY OF TYPE 2 DIABETES COMPLAINS OF LEFT LOWER TOOTHACHE FOR 

2 MONTHS, EVALUATED IN EMERGENCY 1 MONTH AGO, HAS NO PRIMARY CARE PROVIDER, 

UNABLE TO FOLLOWUP WITH A DENTIST.  DENIES FEVER, DIFFICULTY BREATHING OR 

SWALLOWING.


Timing/Duration: gradual onset


Severity: moderate


ENT Location: dental


Prearrival Treatment: no prearrival treatment


Modifying Factors: Improves With: nothing


Associated Symptoms: facial pain/swelling


Allergies/Adverse Reactions: 








tomato Allergy (Verified 05/07/18 11:13)


 





Home Medications: 








Metformin HCl [Metformin HCl ER] 500 mg PO BID 12/21/16 [History]


Fenofibrate Nanocrystallized [Fenofibrate] 48 mg DAILY 11/18/18 [History]





Hx Tetanus, Diphtheria Vaccination/Date Given: Yes


Hx Influenza Vaccination/Date Given: No


Hx Pneumococcal Vaccination/Date Given: No


Immunizations Up to Date: Yes





- Review of Systems


Constitutional: No Fever, No Chills


Eyes: No Symptoms


Ears, Nose, & Throat: Loose Teeth


Respiratory: No Symptoms, No Cough, No Dyspnea


Cardiac: No Symptoms, No Chest Pain, No Edema, No Syncope


Abdominal/Gastrointestinal: No Abdominal Pain, No Nausea, No Vomiting, No 

Diarrhea


Genitourinary Symptoms: No Dysuria


Musculoskeletal: No Back Pain, No Neck Pain


Skin: No Rash


Neurological: No Dizziness, No Focal Weakness, No Sensory Changes


Psychological: No Symptoms


Endocrine: No Symptoms


All Other Systems: Reviewed and Negative





- Past Medical History


Pertinent Past Medical History: Yes


Neurological History: No Pertinent History


ENT History: No Pertinent History


Cardiac History: High Cholesterol


Respiratory History: No Pertinent History


Endocrine Medical History: Diabetes Type II


Musculoskeletal History: Other


GI Medical History: No Pertinent History


 History: No Pertinent History


Psycho-Social History: No Pertinent History


Female Reproductive Disorders: No Pertinent History


Other Medical History: PT. IS A SMOKER NAD HAS HX CHRONIC HIP PN. TUBAL LIGATION

, AND ORAL SURGERY; HX MARIJUANA USE





- Past Surgical History


Past Surgical History: Yes


Neuro Surgical History: No Pertinent History


Cardiac: No Pertinent History


Respiratory: No Pertinent History


Gastrointestinal: No Pertinent History


Genitourinary: No Pertinent History


Musculoskeletal: No Pertinent History


Female Surgical History: Tubal Ligation


Other Surgical History: oral surgery, ear drum cauterization,





- Social History


Smoking Status: Current every day smoker


How long have you smoked: 22 yrs


Exposure to second hand smoke: Yes


Drug Use: marijuana


Patient Lives Alone: No


Significant Family History: hypertension (mother)





- Female History


Hx Last Menstrual Period: now


Hx Pregnant Now: No





- Nursing Vital Signs


Nursing Vital Signs: 


 Initial Vital Signs











Temperature  98.5 F   11/18/18 12:02


 


Pulse Rate  86   11/18/18 12:02


 


Respiratory Rate  16   11/18/18 12:02


 


Blood Pressure  140/90   11/18/18 12:02


 


O2 Sat by Pulse Oximetry  98   11/18/18 12:02








 Pain Scale











Pain Intensity                 2

















- Physical Exam


General Appearance: no apparent distress, alert


Eye Exam: bilateral eye: normal inspection, PERRL


Ear Exam: bilateral ear: auricle normal, canal normal


Nasal Exam: foreign body


Throat Exam: pharynx normal, dental tenderness (WIDESPREAD DENTAL CARIES WITH 

EROSIONS INTO GINGIVA)


Cardiovascular/Respiratory Exam: chest non-tender, normal breath sounds, 

regular rate/rhythm


SpO2: 98


Oxygen Delivery: Room Air





- Departure


Time of Disposition: 12:30


Departure Disposition: Home


Clinical Impression: 


 WIDESPREAD DENTAL CARIES





Condition: Stable


Critical Care Time: No


Referrals: 


DOCTOR,NO FAMILY [Primary Care Provider] - 


Additional Instructions: 


TYLENOL OR MOTRIN AS NEEDED FOR PAIN OR FEVER. CONSULT A DENTIST OR FOLLOWUP AT 

[facility] FOR TREATMENT. ANTIBIOTIC AMOXICILLIN 

500MG EVERY 8 HOURS FOR 10 DAYS.


Prescriptions: 


Amoxicillin 500 mg PO TID #30 capsule

## 2019-07-03 ENCOUNTER — HOSPITAL ENCOUNTER (EMERGENCY)
Dept: HOSPITAL 33 - ED | Age: 39
Discharge: HOME | End: 2019-07-03
Payer: COMMERCIAL

## 2019-07-03 VITALS — SYSTOLIC BLOOD PRESSURE: 161 MMHG | DIASTOLIC BLOOD PRESSURE: 106 MMHG | HEART RATE: 89 BPM | OXYGEN SATURATION: 98 %

## 2019-07-03 DIAGNOSIS — B99.9: Primary | ICD-10-CM

## 2019-07-03 DIAGNOSIS — K13.79: ICD-10-CM

## 2019-07-03 PROCEDURE — 99283 EMERGENCY DEPT VISIT LOW MDM: CPT

## 2019-07-03 NOTE — ERPHSYRPT
- History of Present Illness


Time Seen by Provider: 07/03/19 15:20


Source: patient


Exam Limitations: no limitations


Patient Subjective Stated Complaint: states has had mouth/tooth pain left 

upper.  since this am.  hx of dental caries and abscesses.  denies fever.


Triage Nursing Assessment: ambulated to room per self.  skin w/d, color normal, 

resp nonlabored.  no swelling to face noted at this time.


Physician History: 





40 y/o white female presents with chronic recurrent left upper molar dental 

pain. she has had several visits for same issue. pt took last tramadol today. 


Timing/Duration: intermittent (chronic)


Severity: moderate


ENT Location: dental


Prearrival Treatment: no prearrival treatment


Modifying Factors: Improves With: nothing


Associated Symptoms: tooth pain


Allergies/Adverse Reactions: 








tomato Allergy (Verified 07/03/19 15:15)


 





Home Medications: 








Celecoxib [Celebrex] 100 mg PO DAILY 07/03/19 [History]


Gabapentin 600 mg PO DAILY 07/03/19 [History]


Tramadol HCl 50 mg*** [Ultram 50 mg***] 50 mg PO DAILY 07/03/19 [History]





Hx Tetanus, Diphtheria Vaccination/Date Given: Yes


Hx Influenza Vaccination/Date Given: No


Hx Pneumococcal Vaccination/Date Given: No





- Review of Systems


Constitutional: No Symptoms


Eyes: No Symptoms


Ears, Nose, & Throat: Other (dental pain)


Respiratory: No Symptoms


Cardiac: No Symptoms


Abdominal/Gastrointestinal: No Symptoms


Genitourinary Symptoms: No Symptoms


Musculoskeletal: No Symptoms


Skin: No Symptoms


Neurological: No Symptoms


Psychological: No Symptoms


Endocrine: No Symptoms


Hematologic/Lymphatic: No Symptoms


Immunological/Allergic: No Symptoms


All Other Systems: Reviewed and Negative





- Past Medical History


Pertinent Past Medical History: Yes


Neurological History: No Pertinent History


ENT History: No Pertinent History


Cardiac History: High Cholesterol


Respiratory History: No Pertinent History


Endocrine Medical History: Diabetes Type II


Musculoskeletal History: Other


GI Medical History: No Pertinent History


 History: No Pertinent History


Psycho-Social History: No Pertinent History


Female Reproductive Disorders: No Pertinent History


Other Medical History: PT. IS A SMOKER NAD HAS HX CHRONIC HIP PN. TUBAL LIGATION

, AND ORAL SURGERY; HX MARIJUANA USE





- Past Surgical History


Past Surgical History: Yes


Neuro Surgical History: No Pertinent History


Cardiac: No Pertinent History


Respiratory: No Pertinent History


Gastrointestinal: No Pertinent History


Genitourinary: No Pertinent History


Musculoskeletal: No Pertinent History


Female Surgical History: Tubal Ligation


Other Surgical History: oral surgery, ear drum cauterization,





- Social History


Smoking Status: Current every day smoker


How long have you smoked: 22 yrs


Exposure to second hand smoke: Yes


Drug Use: none


Patient Lives Alone: No


Significant Family History: hypertension (mother)





- Female History


Hx Last Menstrual Period: four days ago


Hx Pregnant Now: No





- Nursing Vital Signs


Nursing Vital Signs: 





 Initial Vital Signs











Temperature  98.4 F   07/03/19 15:10


 


Pulse Rate  89   07/03/19 15:10


 


Respiratory Rate  16   07/03/19 15:10


 


Blood Pressure  161/106   07/03/19 15:10


 


O2 Sat by Pulse Oximetry  98   07/03/19 15:10








 Pain Scale











Pain Intensity                 8

















- Physical Exam


General Appearance: no apparent distress, alert, anxiety


Eye Exam: bilateral eye: normal inspection, PERRL, EOMI


Nasal Exam: normal inspection


Throat Exam: dental tenderness


Neck Exam: normal inspection, non-tender, supple, full range of motion, trachea 

midline


Cardiovascular/Respiratory Exam: chest non-tender, normal breath sounds, 

regular rate/rhythm


Abdominal Exam: non-tender


Neurologic Exam: alert, oriented x 3, cooperative, CNs II-XII nml as tested


Skin Exam: normal color, warm, dry


**SpO2 Interpretation**: normal


SpO2: 98


O2 Delivery: Room Air





- Course


Nursing assessment & vital signs reviewed: Yes


Ordered Tests: 





Medication Summary














Discontinued Medications














Generic Name Dose Route Start Last Admin





  Trade Name Freq  PRN Reason Stop Dose Admin


 


Amoxicillin  500 mg  07/03/19 15:24  





  Amoxil 500 Mg***  PO  07/03/19 15:25  





  STAT ONE   





     





     





     





     


 


Oxycodone/Acetaminophen  1 tab  07/03/19 15:23  





  Percocet Tablet 5/325mg***  PO  07/03/19 15:24  





  STAT STA   





     





     





     





     














- Progress


Progress: unchanged


Counseled pt/family regarding: diagnosis, need for follow-up





- Departure


Departure Disposition: Home


Clinical Impression: 


 Chronic dental infection





Condition: Stable


Critical Care Time: No


Referrals: 


DOCTOR,NO FAMILY [Primary Care Provider] - 


Additional Instructions: 


follow up with dentist for definitive care. 


Forms:  Work/School Release Form


Prescriptions: 


Amoxicillin 500 mg Cap*** [Amoxil 500 mg***] 500 mg PO TID #30 capsule


Tramadol HCl 50 mg*** [Ultram 50 mg***] 50 mg PO TID PRN #15 tablet


 PRN Reason: Moderate Pain

## 2020-05-23 ENCOUNTER — HOSPITAL ENCOUNTER (EMERGENCY)
Dept: HOSPITAL 33 - ED | Age: 40
Discharge: HOME | End: 2020-05-23
Payer: SELF-PAY

## 2020-05-23 VITALS — HEART RATE: 101 BPM | OXYGEN SATURATION: 98 %

## 2020-05-23 VITALS — SYSTOLIC BLOOD PRESSURE: 143 MMHG | DIASTOLIC BLOOD PRESSURE: 90 MMHG

## 2020-05-23 DIAGNOSIS — E78.00: ICD-10-CM

## 2020-05-23 DIAGNOSIS — E11.9: ICD-10-CM

## 2020-05-23 DIAGNOSIS — H60.91: Primary | ICD-10-CM

## 2020-05-23 PROCEDURE — 99283 EMERGENCY DEPT VISIT LOW MDM: CPT

## 2020-05-23 NOTE — ERPHSYRPT
- History of Present Illness


Time Seen by Provider: 05/23/20 15:33


Source: patient


Exam Limitations: no limitations


Patient Subjective Stated Complaint: Earache to right ear


Triage Nursing Assessment: Patient ambulated back to ED and transferred self to 

bed. Patient A+O X3. Patient's skin pink, warm and dry. Patient complains of 

earache to right ear that started yesterday. Patient states pain is constant 

aching and sharp pain 10/10.  Patient complains of occasional dizziness because 

of her earache.


Physician History: 





Hx of recurrent Otitis externa. this episode started yesterday. severe pain R 

side of face.


Timing/Duration: abrupt onset


Severity: severe


ENT Location: ear (R)


Prearrival Treatment: no prearrival treatment


Modifying Factors: Improves With: nothing


Associated Symptoms: ear pain (R), dizziness, facial pain/swelling


Allergies/Adverse Reactions: 








tomato Allergy (Verified 05/23/20 15:22)


 





Hx Tetanus, Diphtheria Vaccination/Date Given: Yes


Hx Influenza Vaccination/Date Given: No


Hx Pneumococcal Vaccination/Date Given: No


Immunizations Up to Date: Yes





Travel Risk





- International Travel


Have you traveled outside of the country in past 3 weeks: No


Have you or anyone close to you been diagnosed with or: No


Do your reside in a community with a known COVID-19 case?: Yes


If Yes where:: [facility]





- Coronavirus Screening


Has patient experienced Coronavirus symptoms: No





- Review of Systems


Constitutional: No Fever, No Chills


Eyes: No Symptoms


Ears, Nose, & Throat: Ear Pain


Respiratory: No Cough, No Dyspnea


Cardiac: No Chest Pain, No Edema, No Syncope


Abdominal/Gastrointestinal: No Abdominal Pain, No Nausea, No Vomiting, No 

Diarrhea


Genitourinary Symptoms: No Dysuria


Musculoskeletal: No Back Pain, No Neck Pain


Skin: No Rash


Neurological: No Dizziness, No Focal Weakness, No Sensory Changes


Psychological: No Symptoms


Endocrine: No Symptoms


All Other Systems: Reviewed and Negative





- Past Medical History


Pertinent Past Medical History: Yes


Neurological History: No Pertinent History


ENT History: No Pertinent History


Cardiac History: High Cholesterol


Respiratory History: No Pertinent History


Endocrine Medical History: Diabetes Type II


Musculoskeletal History: Other


GI Medical History: No Pertinent History


 History: No Pertinent History


Psycho-Social History: No Pertinent History


Female Reproductive Disorders: No Pertinent History


Other Medical History: PT. IS A SMOKER NAD HAS HX CHRONIC HIP PN. TUBAL LIGATION

, AND ORAL SURGERY; HX MARIJUANA USE





- Past Surgical History


Past Surgical History: Yes


Neuro Surgical History: No Pertinent History


Cardiac: No Pertinent History


Respiratory: No Pertinent History


Gastrointestinal: No Pertinent History


Genitourinary: No Pertinent History


Musculoskeletal: No Pertinent History


Female Surgical History: Tubal Ligation


Other Surgical History: oral surgery, ear drum cauterization,





- Social History


Smoking Status: Current every day smoker


How long have you smoked: 22 yrs


Exposure to second hand smoke: Yes


Drug Use: none


Patient Lives Alone: No


Significant Family History: hypertension (mother)





- Female History


Hx Last Menstrual Period: 1 month ago


Hx Pregnant Now: No





- Nursing Vital Signs


Nursing Vital Signs: 





 Initial Vital Signs











Temperature  98.0 F   05/23/20 15:23


 


Pulse Rate  101 H  05/23/20 15:23


 


Respiratory Rate  18   05/23/20 15:23


 


O2 Sat by Pulse Oximetry  98   05/23/20 15:23








 Pain Scale











Pain Intensity                 10

















- Physical Exam


General Appearance: no apparent distress, alert


Eye Exam: bilateral eye: PERRL, EOMI


Ear Exam: right ear: swelling, tenderness, other (canal edematous, tragal 

tenderness)


Nasal Exam: normal inspection


Throat Exam: pharynx normal, moist mucus membranes, No tonsillar exudate


Neck Exam: supple


Cardiovascular/Respiratory Exam: normal breath sounds, regular rate/rhythm


Abdominal Exam: non-tender, soft


Neurologic Exam: alert, oriented x 3, sensation nml, No motor deficits


Skin Exam: normal color, warm, dry


SpO2: 98





- Course


Nursing assessment & vital signs reviewed: Yes





- Progress


Progress: unchanged





- Departure


Departure Disposition: Home


Clinical Impression: 


 Otitis externa





Condition: Stable


Critical Care Time: No


Referrals: 


LEATHA MOYA DO [Primary Care Provider] - 


Instructions:  Ear Infections (Otitis Media) in Children (DC)


Forms:  Work/School Release Form


Prescriptions: 


Hydrocodone/APAP 5-325 Tab^^^ [Norco 5-325 Tablet^^^] 1 tab PO Q6HPRN PRN #10 

tablet MDD 6


 PRN Reason: Pain


Amoxicillin 500 mg PO TID 10 Days #30 tablet


Neomy Sulf/Polymyx B Sulf/Hc [Cortisporin Ear Suspension] 10 ml OT TID 10 Days #

1 drops.susp

## 2020-10-24 ENCOUNTER — HOSPITAL ENCOUNTER (OUTPATIENT)
Dept: HOSPITAL 33 - ED | Age: 40
Setting detail: OBSERVATION
LOS: 3 days | Discharge: HOME | End: 2020-10-27
Attending: FAMILY MEDICINE | Admitting: FAMILY MEDICINE
Payer: MEDICAID

## 2020-10-24 DIAGNOSIS — Z79.899: ICD-10-CM

## 2020-10-24 DIAGNOSIS — E78.00: ICD-10-CM

## 2020-10-24 DIAGNOSIS — N76.4: Primary | ICD-10-CM

## 2020-10-24 DIAGNOSIS — K02.9: ICD-10-CM

## 2020-10-24 DIAGNOSIS — L03.314: ICD-10-CM

## 2020-10-24 DIAGNOSIS — E11.65: ICD-10-CM

## 2020-10-24 DIAGNOSIS — F17.200: ICD-10-CM

## 2020-10-24 LAB
ALBUMIN SERPL-MCNC: 4.2 G/DL (ref 3.5–5)
ALP SERPL-CCNC: 85 U/L (ref 38–126)
ALT SERPL-CCNC: 16 U/L (ref 0–35)
ANION GAP SERPL CALC-SCNC: 10.5 MEQ/L (ref 5–15)
AST SERPL QL: 20 U/L (ref 14–36)
BASOPHILS # BLD AUTO: 0.04 10*3/UL (ref 0–0.4)
BASOPHILS NFR BLD AUTO: 0.4 % (ref 0–0.4)
BILIRUB BLD-MCNC: 0.6 MG/DL (ref 0.2–1.3)
BUN SERPL-MCNC: 3 MG/DL (ref 7–17)
CALCIUM SPEC-MCNC: 9.3 MG/DL (ref 8.4–10.2)
CHLORIDE SERPL-SCNC: 102 MMOL/L (ref 98–107)
CO2 SERPL-SCNC: 26 MMOL/L (ref 22–30)
CREAT SERPL-MCNC: 0.44 MG/DL (ref 0.52–1.04)
EOSINOPHIL # BLD AUTO: 0.15 10*3/UL (ref 0–0.5)
GFR SERPLBLD BASED ON 1.73 SQ M-ARVRAT: > 60 ML/MIN
GLUCOSE SERPL-MCNC: 301 MG/DL (ref 74–106)
HCT VFR BLD AUTO: 42 % (ref 35–47)
HGB BLD-MCNC: 13.5 GM/DL (ref 12–16)
LYMPHOCYTES # SPEC AUTO: 2.55 10*3/UL (ref 1–4.6)
MCH RBC QN AUTO: 28.3 PG (ref 26–32)
MCHC RBC AUTO-ENTMCNC: 32.1 G/DL (ref 32–36)
MONOCYTES # BLD AUTO: 0.81 10*3/UL (ref 0–1.3)
PLATELET # BLD AUTO: 239 K/MM3 (ref 150–450)
POTASSIUM SERPLBLD-SCNC: 4 MMOL/L (ref 3.5–5.1)
PROT SERPL-MCNC: 7.5 G/DL (ref 6.3–8.2)
RBC # BLD AUTO: 4.77 M/MM3 (ref 4.1–5.4)
SODIUM SERPL-SCNC: 135 MMOL/L (ref 137–145)
WBC # BLD AUTO: 11.3 K/MM3 (ref 4–10.5)

## 2020-10-24 PROCEDURE — 84703 CHORIONIC GONADOTROPIN ASSAY: CPT

## 2020-10-24 PROCEDURE — 83036 HEMOGLOBIN GLYCOSYLATED A1C: CPT

## 2020-10-24 PROCEDURE — 96365 THER/PROPH/DIAG IV INF INIT: CPT

## 2020-10-24 PROCEDURE — 87070 CULTURE OTHR SPECIMN AEROBIC: CPT

## 2020-10-24 PROCEDURE — 72193 CT PELVIS W/DYE: CPT

## 2020-10-24 PROCEDURE — 80048 BASIC METABOLIC PNL TOTAL CA: CPT

## 2020-10-24 PROCEDURE — G0378 HOSPITAL OBSERVATION PER HR: HCPCS

## 2020-10-24 PROCEDURE — 85025 COMPLETE CBC W/AUTO DIFF WBC: CPT

## 2020-10-24 PROCEDURE — 80053 COMPREHEN METABOLIC PANEL: CPT

## 2020-10-24 PROCEDURE — 87040 BLOOD CULTURE FOR BACTERIA: CPT

## 2020-10-24 PROCEDURE — 82962 GLUCOSE BLOOD TEST: CPT

## 2020-10-24 PROCEDURE — 83605 ASSAY OF LACTIC ACID: CPT

## 2020-10-24 PROCEDURE — 96375 TX/PRO/DX INJ NEW DRUG ADDON: CPT

## 2020-10-24 PROCEDURE — 36415 COLL VENOUS BLD VENIPUNCTURE: CPT

## 2020-10-24 PROCEDURE — 96368 THER/DIAG CONCURRENT INF: CPT

## 2020-10-24 PROCEDURE — 99225: CPT

## 2020-10-24 PROCEDURE — 80202 ASSAY OF VANCOMYCIN: CPT

## 2020-10-24 PROCEDURE — 96374 THER/PROPH/DIAG INJ IV PUSH: CPT

## 2020-10-24 PROCEDURE — 36000 PLACE NEEDLE IN VEIN: CPT

## 2020-10-24 PROCEDURE — 99285 EMERGENCY DEPT VISIT HI MDM: CPT

## 2020-10-24 NOTE — ERPHSYRPT
- History of Present Illness


Time Seen by Provider: 10/24/20 21:29


Source: patient


Exam Limitations: no limitations


Patient Subjective Stated Complaint: pt states "I have a bite on vagina"


Triage Nursing Assessment: pt ambulated into the er; pt is axo x4; c/o abscess 

to orquidea area; states 6/10 pain to orquidea area; area is red, warm and hard to the t

ouch; minimal drainage present; hypertensive; tachycardic


Physician History: 





40 years old morbidly obese female with a history of diabetes mellitus presented

in the ER with pubic swelling which she noticed 2 days ago and is gradually 

worsening.  Patient report initially it was a pea size currently involves the 

whole left half of pubic area associated with moderate intensity sharp throbbing

pain which gets worse with movement and palpation and better with taking over-th

e-counter pain medication.  She denies associated fever or chills.  She tried to

squeeze it with minimal discharge earlier.  Denies any difficulty urination or 

abdominal/pelvic pain.  Denies history of MRSA.  Blood sugar is fairly 

controlled per patient.


Timing/Duration: day(s) (2), gradual onset, worse


Severity: moderate


Modifying Factors: Improves With: movement, rest, acetaminophen, ibuprofen


Associated Symptoms: rash, No chills, No fever


Allergies/Adverse Reactions: 








tomato Allergy (Verified 10/24/20 21:25)


   





Home Medications: 








No Reportable Medications [No Reported Medications]  10/24/20 [History]





Hx Tetanus, Diphtheria Vaccination/Date Given: Yes


Hx Influenza Vaccination/Date Given: No


Hx Pneumococcal Vaccination/Date Given: No





Travel Risk





- International Travel


Have you traveled outside of the country in past 3 weeks: No





- Coronavirus Screening


Are you exhibiting any of the following symptoms?: No


Close contact with a COVID-19 positive Pt in past 14-21 Days: No





- Review of Systems


Constitutional: No Symptoms


Eyes: No Symptoms


Ears, Nose, & Throat: No Symptoms


Respiratory: No Symptoms


Cardiac: No Symptoms


Abdominal/Gastrointestinal: No Symptoms


Genitourinary Symptoms: Other


Musculoskeletal: No Symptoms


Skin: Cellulitis, Induration


Neurological: No Symptoms


Psychological: No Symptoms


Endocrine: No Symptoms


Hematologic/Lymphatic: No Symptoms


Immunological/Allergic: No Symptoms





- Past Medical History


Pertinent Past Medical History: Yes


Neurological History: No Pertinent History


ENT History: No Pertinent History


Cardiac History: High Cholesterol


Respiratory History: No Pertinent History


Endocrine Medical History: Diabetes Type II


Musculoskeletal History: Other


GI Medical History: No Pertinent History


 History: No Pertinent History


Psycho-Social History: No Pertinent History


Female Reproductive Disorders: No Pertinent History


Other Medical History: PT. IS A SMOKER NAD HAS HX CHRONIC HIP PN. TUBAL 

LIGATION, AND ORAL SURGERY; HX MARIJUANA USE





- Past Surgical History


Past Surgical History: Yes


Neuro Surgical History: No Pertinent History


Cardiac: No Pertinent History


Respiratory: No Pertinent History


Gastrointestinal: No Pertinent History


Genitourinary: No Pertinent History


Musculoskeletal: No Pertinent History


Female Surgical History: Tubal Ligation


Other Surgical History: oral surgery, ear drum cauterization,





- Social History


Smoking Status: Current every day smoker


How long have you smoked: 22 yrs


Exposure to second hand smoke: Yes


Drug Use: none


Patient Lives Alone: No


Significant Family History: hypertension (mother)





- Female History


Hx Pregnant Now:  (unkn)





- Nursing Vital Signs


Nursing Vital Signs: 


                               Initial Vital Signs











Temperature  98.8 F   10/24/20 21:27


 


Pulse Rate  111 H  10/24/20 21:27


 


Respiratory Rate  16   10/24/20 21:27


 


Blood Pressure  160/103   10/24/20 21:27


 


O2 Sat by Pulse Oximetry  99   10/24/20 21:27








                                   Pain Scale











Pain Intensity                 1

















- Physical Exam


General Appearance: no apparent distress, alert


Eye Exam: PERRL/EOMI


Ears, Nose, Throat Exam: normal ENT inspection


Neck Exam: normal inspection, supple, full range of motion


Respiratory Exam: normal breath sounds, lungs clear


Cardiovascular Exam: regular rate/rhythm, normal heart sounds


Gastrointestinal/Abdomen Exam: soft, normal bowel sounds, No tenderness


Pelvic Exam: other (Left pubic area swelling redness/induration.  Warm and 

tender to touch.  Firm in consistency.  Negative fluctuation.)


Back Exam: normal inspection, normal range of motion


Extremity Exam: normal inspection


Neurologic Exam: alert, oriented x 3, cooperative


Lymphatic Exam: adenopathy, inguinal node tender (L)


**SpO2 Interpretation**: normal


SpO2: 98


O2 Delivery: Room Air





- Course


Nursing assessment & vital signs reviewed: Yes


Ordered Tests: 


                               Active Orders 24 hr











 Category Date Time Status


 


 IV Insertion STAT Care  10/24/20 21:48 Active


 


 PELVIS WITH CONTRAST [CT] Stat Exams  10/24/20 21:45 Taken


 


 BLOOD CULTURE Stat Lab  10/24/20 22:50 Received


 


 CBC W DIFF Stat Lab  10/24/20 22:15 Completed


 


 CMP Stat Lab  10/24/20 22:15 Completed


 


 HCG,QUALITATIVE URINE Stat Lab  10/24/20 23:52 Ordered


 


 Lactic Acid Stat Lab  10/24/20 22:05 Completed


 


 Lactic Acid Stat Lab  10/25/20 00:16 Received








Medication Summary











Generic Name Dose Route Start Last Admin





  Trade Name Avis  PRN Reason Stop Dose Admin


 


Vancomycin HCl  2 gm in 400 mls @ 133.333 mls/hr  10/24/20 21:47  10/24/20 22:23





  Vancomycin 2 Gram/400 Ml Bag  IV  10/25/20 00:46  133 mls/hr





  STAT ONE   133 mls/hr





    Administration














Discontinued Medications














Generic Name Dose Route Start Last Admin





  Trade Name Avis  PRN Reason Stop Dose Admin


 


Piperacillin Sod/Tazobactam  100 mls @ 200 mls/hr  10/24/20 21:46  10/24/20 

22:22





  Sod 3.375 gm/ Sodium Chloride  IV  10/24/20 22:15  200 mls/hr





  STAT ONE   Administration


 


Sodium Chloride  Confirm  10/24/20 22:20 





  Sodium Chloride 100ml Mini-Bag Plus  Administered  10/24/20 22:21 





  Dose  





  100 mls @ ud  





  IV  





  .STK-MED ONE  


 


Vancomycin HCl  Confirm  10/24/20 22:20 





  Vancomycin 2 Gram/400 Ml Bag  Administered  10/24/20 22:21 





  Dose  





  2 gm in 400 mls @ ud  





  IV  





  .STK-MED ONE  


 


Morphine Sulfate  4 mg  10/24/20 21:48  10/24/20 22:25





  Morphine Sulfate 4 Mg Inj***  IV  10/24/20 21:49  4 mg





  STAT ONE   Administration


 


Morphine Sulfate  Confirm  10/24/20 22:19 





  Morphine Sulfate 4 Mg Inj***  Administered  10/24/20 22:20 





  Dose  





  4 mg  





  .ROUTE  





  .STK-MED ONE  


 


Ondansetron HCl  4 mg  10/24/20 21:48  10/24/20 22:23





  Zofran 4 Mg/2 Ml Vial**  IV  10/24/20 21:49  4 mg





  STAT ONE   Administration


 


Ondansetron HCl  Confirm  10/24/20 22:19 





  Zofran 4 Mg/2 Ml Vial**  Administered  10/24/20 22:20 





  Dose  





  4 mg  





  .ROUTE  





  .STK-MED ONE  


 


Piperacillin Sod/Tazobactam Sod  Confirm  10/24/20 22:20 





  Zosyn 3.375 Gm Vial  Administered  10/24/20 22:21 





  Dose  





  3.375 gm  





  IV  





  .whodoyou-MED ONE  











Lab/Rad Data: 


                           Laboratory Result Diagrams





                                 10/24/20 22:15 





                                 10/24/20 22:15 





                               Laboratory Results











  10/24/20 10/24/20 10/24/20 Range/Units





  22:15 22:15 22:05 


 


WBC   11.3 H   (4.0-10.5)  K/mm3


 


RBC   4.77   (4.1-5.4)  M/mm3


 


Hgb   13.5   (12.0-16.0)  gm/dl


 


Hct   42.0   (35-47)  %


 


MCV   88.1   ()  fl


 


MCH   28.3   (26-32)  pg


 


MCHC   32.1   (32-36)  g/dl


 


RDW   13.5   (11.5-14.0)  %


 


Plt Count   239   (150-450)  K/mm3


 


MPV   9.8   (7.5-11.0)  fl


 


Gran %   68.4 H   (36.0-66.0)  %


 


Eos # (Auto)   0.15   (0-0.5)  


 


Absolute Lymphs (auto)   2.55   (1.0-4.6)  


 


Absolute Monos (auto)   0.81   (0.0-1.3)  


 


Lymphocytes %   22.7 L   (24.0-44.0)  %


 


Monocytes %   7.2   (0.0-12.0)  %


 


Eosinophils %   1.3   (0.00-5.0)  %


 


Basophils %   0.4   (0.0-0.4)  %


 


Absolute Granulocytes   7.70 H   (1.4-6.9)  


 


Basophils #   0.04   (0-0.4)  


 


Sodium  135 L    (137-145)  mmol/L


 


Potassium  4.0    (3.5-5.1)  mmol/L


 


Chloride  102    ()  mmol/L


 


Carbon Dioxide  26    (22-30)  mmol/L


 


Anion Gap  10.5    (5-15)  MEQ/L


 


BUN  3 L    (7-17)  mg/dL


 


Creatinine  0.44 L    (0.52-1.04)  mg/dL


 


Estimated GFR  > 60.0    ML/MIN


 


Glucose  301 H    ()  mg/dL


 


Lactic Acid    2.2 H  (0.4-2.0)  


 


Calcium  9.3    (8.4-10.2)  mg/dL


 


Total Bilirubin  0.60    (0.2-1.3)  mg/dL


 


AST  20    (14-36)  U/L


 


ALT  16    (0-35)  U/L


 


Alkaline Phosphatase  85    ()  U/L


 


Serum Total Protein  7.5    (6.3-8.2)  g/dL


 


Albumin  4.2    (3.5-5.0)  g/dL














- Progress


Progress: pain not gone completely


Progress Note: 





10/25/20 00:18


40 years old diabetic is evaluated for pubic area swelling the developing 

abscess.  She does not have any fluctuation.  I have given her pain medication 

and started her on IV antibiotics Zosyn and vancomycin.  She has a white count 

of 11, lactate of 2.2.  CT pelvis with contrast showing cellulitis with minimal 

fluid collection which could be a small abscess of 1.3 cm.  At this point no 

signs of Renetta gangrene.  CT also showed findings of ileus but patient has 

good bowel sounds and has bowel movement today, no abdominal pain nausea or 

vomiting.  Discussed with Dr. Rivera and patient is admitted.  Plan discussed 

with patient who understand and agrees with it.


Will see patient in: hospital (observation)


Counseled pt/family regarding: lab results, diagnosis, rad results





- Departure


Departure Disposition: Observation


Clinical Impression: 


 Cellulitis of pubic region





Condition: Stable


Critical Care Time: No


Referrals: 


LEATHA MOYA DO [Primary Care Provider] -

## 2020-10-25 LAB
ALBUMIN SERPL-MCNC: 3.7 G/DL (ref 3.5–5)
ALP SERPL-CCNC: 73 U/L (ref 38–126)
ALT SERPL-CCNC: 14 U/L (ref 0–35)
ANION GAP SERPL CALC-SCNC: 9.9 MEQ/L (ref 5–15)
AST SERPL QL: 18 U/L (ref 14–36)
BASOPHILS # BLD AUTO: 0.03 10*3/UL (ref 0–0.4)
BASOPHILS NFR BLD AUTO: 0.3 % (ref 0–0.4)
BILIRUB BLD-MCNC: 0.6 MG/DL (ref 0.2–1.3)
BUN SERPL-MCNC: 5 MG/DL (ref 7–17)
CALCIUM SPEC-MCNC: 8.6 MG/DL (ref 8.4–10.2)
CHLORIDE SERPL-SCNC: 105 MMOL/L (ref 98–107)
CO2 SERPL-SCNC: 23 MMOL/L (ref 22–30)
CREAT SERPL-MCNC: 0.45 MG/DL (ref 0.52–1.04)
EOSINOPHIL # BLD AUTO: 0.19 10*3/UL (ref 0–0.5)
GFR SERPLBLD BASED ON 1.73 SQ M-ARVRAT: > 60 ML/MIN
GLUCOSE SERPL-MCNC: 284 MG/DL (ref 74–106)
HCT VFR BLD AUTO: 40.1 % (ref 35–47)
HGB BLD-MCNC: 12.6 GM/DL (ref 12–16)
LYMPHOCYTES # SPEC AUTO: 1.99 10*3/UL (ref 1–4.6)
MCH RBC QN AUTO: 27.9 PG (ref 26–32)
MCHC RBC AUTO-ENTMCNC: 31.4 G/DL (ref 32–36)
MONOCYTES # BLD AUTO: 0.78 10*3/UL (ref 0–1.3)
PLATELET # BLD AUTO: 217 K/MM3 (ref 150–450)
POTASSIUM SERPLBLD-SCNC: 3.7 MMOL/L (ref 3.5–5.1)
PROT SERPL-MCNC: 6.7 G/DL (ref 6.3–8.2)
RBC # BLD AUTO: 4.52 M/MM3 (ref 4.1–5.4)
SODIUM SERPL-SCNC: 134 MMOL/L (ref 137–145)
WBC # BLD AUTO: 10 K/MM3 (ref 4–10.5)

## 2020-10-25 RX ADMIN — VANCOMYCIN SCH MLS/HR: 1 INJECTION, SOLUTION INTRAVENOUS at 23:02

## 2020-10-25 RX ADMIN — FAMOTIDINE SCH MG: 10 INJECTION INTRAVENOUS at 10:21

## 2020-10-25 RX ADMIN — MORPHINE SULFATE PRN MG: 2 INJECTION, SOLUTION INTRAMUSCULAR; INTRAVENOUS at 10:27

## 2020-10-25 RX ADMIN — INSULIN LISPRO PRN UNIT: 100 INJECTION, SOLUTION INTRAVENOUS; SUBCUTANEOUS at 12:41

## 2020-10-25 RX ADMIN — VANCOMYCIN SCH MLS/HR: 1 INJECTION, SOLUTION INTRAVENOUS at 16:24

## 2020-10-25 RX ADMIN — FAMOTIDINE SCH MG: 10 INJECTION INTRAVENOUS at 21:21

## 2020-10-25 RX ADMIN — INSULIN GLARGINE SCH UNIT: 100 INJECTION, SOLUTION SUBCUTANEOUS at 21:21

## 2020-10-25 RX ADMIN — CEFEPIME HYDROCHLORIDE SCH MLS/HR: 2 INJECTION, POWDER, FOR SOLUTION INTRAVENOUS at 19:01

## 2020-10-25 RX ADMIN — CEFEPIME HYDROCHLORIDE SCH MLS/HR: 2 INJECTION, POWDER, FOR SOLUTION INTRAVENOUS at 12:17

## 2020-10-25 RX ADMIN — MORPHINE SULFATE PRN MG: 2 INJECTION, SOLUTION INTRAMUSCULAR; INTRAVENOUS at 20:39

## 2020-10-25 RX ADMIN — CEFEPIME HYDROCHLORIDE SCH MLS/HR: 2 INJECTION, POWDER, FOR SOLUTION INTRAVENOUS at 05:33

## 2020-10-25 RX ADMIN — MORPHINE SULFATE PRN MG: 2 INJECTION, SOLUTION INTRAMUSCULAR; INTRAVENOUS at 01:40

## 2020-10-25 RX ADMIN — VANCOMYCIN SCH MLS/HR: 1 INJECTION, SOLUTION INTRAVENOUS at 08:39

## 2020-10-25 RX ADMIN — INSULIN LISPRO PRN UNIT: 100 INJECTION, SOLUTION INTRAVENOUS; SUBCUTANEOUS at 08:44

## 2020-10-25 RX ADMIN — MORPHINE SULFATE PRN MG: 2 INJECTION, SOLUTION INTRAMUSCULAR; INTRAVENOUS at 16:25

## 2020-10-25 RX ADMIN — INSULIN LISPRO PRN UNIT: 100 INJECTION, SOLUTION INTRAVENOUS; SUBCUTANEOUS at 17:18

## 2020-10-25 RX ADMIN — MORPHINE SULFATE PRN MG: 2 INJECTION, SOLUTION INTRAMUSCULAR; INTRAVENOUS at 06:23

## 2020-10-25 RX ADMIN — INSULIN LISPRO PRN UNIT: 100 INJECTION, SOLUTION INTRAVENOUS; SUBCUTANEOUS at 21:21

## 2020-10-25 NOTE — PCM.HP
History of Present Illness





- Chief Complaint


Chief Complaint: PUBIC AREA CELLULITIS


History of Present Illness: 


 is a 40 year old female with untreated DM2 due to not having 

medical insurance. 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

on Metformin and Jardiance but in reviewing her pharmacy records it was Januvia 

NOT Jardiance.  She was diagnosed with cellulitis left pubic and groin 

area,started on Vancomycin and Zosyn in ER. Is not currently on Diabetic me

ds,last PCP visit was JULY 2019. Started on sliding scale insulin and diabetic 

diet and Basal insulin .








- Review of Systems


Constitutional: Fatigue


Eyes: No Symptoms


Ears, Nose, & Throat: No Symptoms


Respiratory: No Symptoms


Cardiac: Other (fast heart beat since pain started)


Abdominal/Gastrointestinal: No Symptoms


Genitourinary Symptoms: No Symptoms


Musculoskeletal: Back Pain (chronic)


Skin: Cellulitis (see HPI)


Neurological: No Symptoms


Psychological: Anxiety (stress)


Endocrine: No Symptoms


Hematologic/Lymphatic: No Symptoms





Medications & Allergies


Home Medications: 


                              Home Medication List





No Reportable Medications [No Reported Medications]  10/24/20 [History Confirmed

 10/24/20]








Allergies/Adverse Reactions: 


                                    Allergies











Allergy/AdvReac Type Severity Reaction Status Date / Time


 


tomato Allergy   Verified 10/24/20 21:25














- Past Medical History


Past Medical History: Yes


Neurological History: No Pertinent History


ENT History: No Pertinent History


Cardiac History: High Cholesterol


Respiratory History: No Pertinent History


Endocrine Medical History: Diabetes Type II


Musculoskelatal History: Other


GI Medical History: No Pertinent History


 History: No Pertinent History


Pyscho-Social History: No Pertinent History


Reproductive Disorders: No Pertinent History


Comment: PT. IS A SMOKER NAD HAS HX CHRONIC HIP PN. TUBAL LIGATION, AND ORAL 

SURGERY; HX MARIJUANA USE





- Female History


Are you pregnant now?: No (unkn)





- Past Surgical History


Past Surgical History: Yes


Neuro Surgical History: No Pertinent History


Cardiac History: No Pertinent History


Respiratory Surgery: No Pertinent History


GI Surgical History: No Pertinent History


Genitourinary Surgical Hx: No Pertinent History


Musculskeletal Surgical Hx: No Pertinent History


Female Surgical History: Tubal Ligation


Other Surgical History: oral surgery, ear drum cauterization,





- Social History


Smoking Status: Current every day smoker


How long have you smoked: 22 yrs


Exposure to second hand smoke: Yes


Alcohol: None


Drug Use: none


Significant Family History: hypertension (mother)





- Physical Exam


Vital Signs: 


                               Vital Signs - 24 hr











  Temp Pulse Resp BP Pulse Ox


 


 10/25/20 12:00  96.2 F  84  18  125/67  97


 


 10/25/20 07:22  98.7 F  92 H  22  131/78  96


 


 10/25/20 03:50  98.5 F  92 H  18  121/72  97


 


 10/25/20 01:15  98.5 F  83  20  141/93  97


 


 10/25/20 00:19      98


 


 10/25/20 00:08   94 H   138/91  99


 


 10/24/20 23:01   97 H   128/105  98


 


 10/24/20 22:51   99 H   167/99  98


 


 10/24/20 21:27  98.8 F  111 H  16  160/103  99











General Appearance: mild distress (due to discomfort pubic area)


Neurologic Exam: alert, oriented x 3, cooperative, normal mood/affect


Eye Exam: eyes nml inspection


Ears, Nose, Throat Exam: moist mucous membranes, other (tooth decay,most front 

teeth are broken off at the root)


Neck Exam: normal inspection


Respiratory Exam: normal breath sounds


Cardiovascular Exam: regular rate/rhythm


Gastrointestinal/Abdomen Exam: soft, normal bowel sounds (nontender)


Pelvic Exam: not done


Rectal Exam: not done


Back Exam: other (no CVA tenderness)


Extremity Exam: other (no edema)


Skin Exam: warm, dry, other (left pubic area with red flat raised indurated 3x8 

cm area with surrounding redness into left groin No pustules or drainage.)


Wound Assessment: 


                              Skin/Wound Assessment





Wound/Incision Assessment                                  Start:  10/25/20 

01:02


Text:                                                      Status: Active       




Freq:   Q6H                                                                     




Protocol:                                                                       




 Document     10/25/20 14:00  AR  (Rec: 10/25/20 14:17  AR  [id number])


 Wound/Incision Assessment


     Left Other


      Wound Type                                 abscess


      Drainage Amount                            Minimal


      Drainage Description                       Serosanguineous


      Drainage Odor                              None/Absent


      General Appearance                         Open to air,Reddened,Draining


      Length (cm) (cm)                           7


      Width (cm) (cm)                            6











Results





- Labs


Lab/Micro Results: 


                            Lab Results-Last 24 Hours











  10/24/20 10/24/20 10/24/20 Range/Units





  22:05 22:15 22:15 


 


WBC   11.3 H   (4.0-10.5)  K/mm3


 


RBC   4.77   (4.1-5.4)  M/mm3


 


Hgb   13.5   (12.0-16.0)  gm/dl


 


Hct   42.0   (35-47)  %


 


MCV   88.1   ()  fl


 


MCH   28.3   (26-32)  pg


 


MCHC   32.1   (32-36)  g/dl


 


RDW   13.5   (11.5-14.0)  %


 


Plt Count   239   (150-450)  K/mm3


 


MPV   9.8   (7.5-11.0)  fl


 


Gran %   68.4 H   (36.0-66.0)  %


 


Eos # (Auto)   0.15   (0-0.5)  


 


Absolute Lymphs (auto)   2.55   (1.0-4.6)  


 


Absolute Monos (auto)   0.81   (0.0-1.3)  


 


Lymphocytes %   22.7 L   (24.0-44.0)  %


 


Monocytes %   7.2   (0.0-12.0)  %


 


Eosinophils %   1.3   (0.00-5.0)  %


 


Basophils %   0.4   (0.0-0.4)  %


 


Absolute Granulocytes   7.70 H   (1.4-6.9)  


 


Basophils #   0.04   (0-0.4)  


 


Sodium    135 L  (137-145)  mmol/L


 


Potassium    4.0  (3.5-5.1)  mmol/L


 


Chloride    102  ()  mmol/L


 


Carbon Dioxide    26  (22-30)  mmol/L


 


Anion Gap    10.5  (5-15)  MEQ/L


 


BUN    3 L  (7-17)  mg/dL


 


Creatinine    0.44 L  (0.52-1.04)  mg/dL


 


Estimated GFR    > 60.0  ML/MIN


 


Glucose    301 H  ()  mg/dL


 


POC Glucometer     (74 to 106)  mg/dL


 


Hemoglobin A1c     (4.5-6.0)  %


 


Lactic Acid  2.2 H    (0.4-2.0)  


 


Calcium    9.3  (8.4-10.2)  mg/dL


 


Total Bilirubin    0.60  (0.2-1.3)  mg/dL


 


AST    20  (14-36)  U/L


 


ALT    16  (0-35)  U/L


 


Alkaline Phosphatase    85  ()  U/L


 


Serum Total Protein    7.5  (6.3-8.2)  g/dL


 


Albumin    4.2  (3.5-5.0)  g/dL


 


Urine HCG, Qual     (Negative)  














  10/24/20 10/25/20 10/25/20 Range/Units





  23:52 00:16 04:30 


 


WBC     (4.0-10.5)  K/mm3


 


RBC     (4.1-5.4)  M/mm3


 


Hgb     (12.0-16.0)  gm/dl


 


Hct     (35-47)  %


 


MCV     ()  fl


 


MCH     (26-32)  pg


 


MCHC     (32-36)  g/dl


 


RDW     (11.5-14.0)  %


 


Plt Count     (150-450)  K/mm3


 


MPV     (7.5-11.0)  fl


 


Gran %     (36.0-66.0)  %


 


Eos # (Auto)     (0-0.5)  


 


Absolute Lymphs (auto)     (1.0-4.6)  


 


Absolute Monos (auto)     (0.0-1.3)  


 


Lymphocytes %     (24.0-44.0)  %


 


Monocytes %     (0.0-12.0)  %


 


Eosinophils %     (0.00-5.0)  %


 


Basophils %     (0.0-0.4)  %


 


Absolute Granulocytes     (1.4-6.9)  


 


Basophils #     (0-0.4)  


 


Sodium     (137-145)  mmol/L


 


Potassium     (3.5-5.1)  mmol/L


 


Chloride     ()  mmol/L


 


Carbon Dioxide     (22-30)  mmol/L


 


Anion Gap     (5-15)  MEQ/L


 


BUN     (7-17)  mg/dL


 


Creatinine     (0.52-1.04)  mg/dL


 


Estimated GFR     ML/MIN


 


Glucose     ()  mg/dL


 


POC Glucometer     (74 to 106)  mg/dL


 


Hemoglobin A1c    10.69 H  (4.5-6.0)  %


 


Lactic Acid   1.7   (0.4-2.0)  


 


Calcium     (8.4-10.2)  mg/dL


 


Total Bilirubin     (0.2-1.3)  mg/dL


 


AST     (14-36)  U/L


 


ALT     (0-35)  U/L


 


Alkaline Phosphatase     ()  U/L


 


Serum Total Protein     (6.3-8.2)  g/dL


 


Albumin     (3.5-5.0)  g/dL


 


Urine HCG, Qual  NEGATIVE    (Negative)  














  10/25/20 10/25/20 10/25/20 Range/Units





  05:50 05:50 07:34 


 


WBC  10.0    (4.0-10.5)  K/mm3


 


RBC  4.52    (4.1-5.4)  M/mm3


 


Hgb  12.6    (12.0-16.0)  gm/dl


 


Hct  40.1    (35-47)  %


 


MCV  88.7    ()  fl


 


MCH  27.9    (26-32)  pg


 


MCHC  31.4 L    (32-36)  g/dl


 


RDW  13.6    (11.5-14.0)  %


 


Plt Count  217    (150-450)  K/mm3


 


MPV  9.4    (7.5-11.0)  fl


 


Gran %  70.1 H    (36.0-66.0)  %


 


Eos # (Auto)  0.19    (0-0.5)  


 


Absolute Lymphs (auto)  1.99    (1.0-4.6)  


 


Absolute Monos (auto)  0.78    (0.0-1.3)  


 


Lymphocytes %  19.9 L    (24.0-44.0)  %


 


Monocytes %  7.8    (0.0-12.0)  %


 


Eosinophils %  1.9    (0.00-5.0)  %


 


Basophils %  0.3    (0.0-0.4)  %


 


Absolute Granulocytes  6.99 H    (1.4-6.9)  


 


Basophils #  0.03    (0-0.4)  


 


Sodium   134 L   (137-145)  mmol/L


 


Potassium   3.7   (3.5-5.1)  mmol/L


 


Chloride   105   ()  mmol/L


 


Carbon Dioxide   23   (22-30)  mmol/L


 


Anion Gap   9.9   (5-15)  MEQ/L


 


BUN   5 L   (7-17)  mg/dL


 


Creatinine   0.45 L   (0.52-1.04)  mg/dL


 


Estimated GFR   > 60.0   ML/MIN


 


Glucose   284 H   ()  mg/dL


 


POC Glucometer    292 H  (74 to 106)  mg/dL


 


Hemoglobin A1c     (4.5-6.0)  %


 


Lactic Acid     (0.4-2.0)  


 


Calcium   8.6   (8.4-10.2)  mg/dL


 


Total Bilirubin   0.60   (0.2-1.3)  mg/dL


 


AST   18   (14-36)  U/L


 


ALT   14   (0-35)  U/L


 


Alkaline Phosphatase   73   ()  U/L


 


Serum Total Protein   6.7   (6.3-8.2)  g/dL


 


Albumin   3.7   (3.5-5.0)  g/dL


 


Urine HCG, Qual     (Negative)  














  10/25/20 Range/Units





  12:18 


 


WBC   (4.0-10.5)  K/mm3


 


RBC   (4.1-5.4)  M/mm3


 


Hgb   (12.0-16.0)  gm/dl


 


Hct   (35-47)  %


 


MCV   ()  fl


 


MCH   (26-32)  pg


 


MCHC   (32-36)  g/dl


 


RDW   (11.5-14.0)  %


 


Plt Count   (150-450)  K/mm3


 


MPV   (7.5-11.0)  fl


 


Gran %   (36.0-66.0)  %


 


Eos # (Auto)   (0-0.5)  


 


Absolute Lymphs (auto)   (1.0-4.6)  


 


Absolute Monos (auto)   (0.0-1.3)  


 


Lymphocytes %   (24.0-44.0)  %


 


Monocytes %   (0.0-12.0)  %


 


Eosinophils %   (0.00-5.0)  %


 


Basophils %   (0.0-0.4)  %


 


Absolute Granulocytes   (1.4-6.9)  


 


Basophils #   (0-0.4)  


 


Sodium   (137-145)  mmol/L


 


Potassium   (3.5-5.1)  mmol/L


 


Chloride   ()  mmol/L


 


Carbon Dioxide   (22-30)  mmol/L


 


Anion Gap   (5-15)  MEQ/L


 


BUN   (7-17)  mg/dL


 


Creatinine   (0.52-1.04)  mg/dL


 


Estimated GFR   ML/MIN


 


Glucose   ()  mg/dL


 


POC Glucometer  231 H  (74 to 106)  mg/dL


 


Hemoglobin A1c   (4.5-6.0)  %


 


Lactic Acid   (0.4-2.0)  


 


Calcium   (8.4-10.2)  mg/dL


 


Total Bilirubin   (0.2-1.3)  mg/dL


 


AST   (14-36)  U/L


 


ALT   (0-35)  U/L


 


Alkaline Phosphatase   ()  U/L


 


Serum Total Protein   (6.3-8.2)  g/dL


 


Albumin   (3.5-5.0)  g/dL


 


Urine HCG, Qual   (Negative)  








                                   Accuchecks











Date                           10/25/20


 


Date                           10/25/20


 


Time                           11:30


 


Time                           07:30

















- Radiology Impressions


Radiology Exams & Impressions: 


                              Radiology Procedures











 Category Date Time Status


 


 PELVIS WITH CONTRAST [CT] Stat Exams  10/24/20 21:45 Completed














Assessment/Plan


(1) Cellulitis of pubic region


Current Visit: Yes   Status: Acute   


Assessment & Plan: 


improved with Zosyn and Vancomycin,less pain and less redness


Code(s): L03.319 - CELLULITIS OF TRUNK, UNSPECIFIED   





(2) Type 2 diabetes mellitus


Current Visit: No   Status: Chronic   


Qualifiers: 


   Diabetes mellitus long term insulin use: without long term use   Diabetes 

mellitus complication status: with hyperglycemia   Qualified Code(s): E11.65 - 

Type 2 diabetes mellitus with hyperglycemia   





(3) Tooth decay


Current Visit: Yes   Status: Chronic   


Assessment & Plan: 


multiple teeth broken off with surrounding gum inflammation


Code(s): K02.9 - DENTAL CARIES, UNSPECIFIED

## 2020-10-25 NOTE — XRAY
Indication: Abscess.  R/O Renetta gangrene.



Multiple contiguous axial images obtained through the pelvis only using 80 cc

Isovue 370 contrast.  Cutaneous marker placed over region of interest.



Comparison: May 7, 2018.



Cutaneous BB overlies the left mons pubis where there is underlying moderate

cutaneous/subcutaneous induration favoring cellulitis.  Left labia majora

demonstrates 1.3 cm fluid collection, possible developing abscess.  No other

walled off fluid collection or subcutaneous emphysema.



Visualized bowel loops appear nonobstructed.  New 3 cm right ovary cyst.

Remaining uterus, urinary bladder, and iliac vessels are unremarkable.

Osseous structures intact again with lumbosacral junction degenerative disc

disease.  No suspicious bony lesions.



Impression:

1.  Cellulitis involving the mons pubis and left labia majora.  Left labia

majora 1.3 cm subcutaneous fluid collection, possible developing abscess.

2.  Incidental 3 cm right ovary cyst better evaluated with pelvic sonogram if

clinically warranted.



Comment: Preliminary interpretation was made by [facility].  No critical discrepancy.

## 2020-10-26 LAB
ANION GAP SERPL CALC-SCNC: 11 MEQ/L (ref 5–15)
BASOPHILS # BLD AUTO: 0.05 10*3/UL (ref 0–0.4)
BASOPHILS NFR BLD AUTO: 0.8 % (ref 0–0.4)
BUN SERPL-MCNC: 4 MG/DL (ref 7–17)
CALCIUM SPEC-MCNC: 8.7 MG/DL (ref 8.4–10.2)
CHLORIDE SERPL-SCNC: 103 MMOL/L (ref 98–107)
CO2 SERPL-SCNC: 24 MMOL/L (ref 22–30)
CREAT SERPL-MCNC: 0.54 MG/DL (ref 0.52–1.04)
EOSINOPHIL # BLD AUTO: 0.18 10*3/UL (ref 0–0.5)
GFR SERPLBLD BASED ON 1.73 SQ M-ARVRAT: > 60 ML/MIN
GLUCOSE SERPL-MCNC: 318 MG/DL (ref 74–106)
HCT VFR BLD AUTO: 39.9 % (ref 35–47)
HGB BLD-MCNC: 12.6 GM/DL (ref 12–16)
LYMPHOCYTES # SPEC AUTO: 1.77 10*3/UL (ref 1–4.6)
MCH RBC QN AUTO: 28.1 PG (ref 26–32)
MCHC RBC AUTO-ENTMCNC: 31.6 G/DL (ref 32–36)
MONOCYTES # BLD AUTO: 0.6 10*3/UL (ref 0–1.3)
PLATELET # BLD AUTO: 216 K/MM3 (ref 150–450)
POTASSIUM SERPLBLD-SCNC: 4.1 MMOL/L (ref 3.5–5.1)
RBC # BLD AUTO: 4.48 M/MM3 (ref 4.1–5.4)
SODIUM SERPL-SCNC: 134 MMOL/L (ref 137–145)
WBC # BLD AUTO: 6.6 K/MM3 (ref 4–10.5)

## 2020-10-26 RX ADMIN — CEFEPIME HYDROCHLORIDE SCH MLS/HR: 2 INJECTION, POWDER, FOR SOLUTION INTRAVENOUS at 00:32

## 2020-10-26 RX ADMIN — INSULIN LISPRO PRN UNIT: 100 INJECTION, SOLUTION INTRAVENOUS; SUBCUTANEOUS at 12:44

## 2020-10-26 RX ADMIN — HYDROCODONE BITARTRATE AND ACETAMINOPHEN PRN TAB: 5; 325 TABLET ORAL at 08:57

## 2020-10-26 RX ADMIN — INSULIN LISPRO PRN UNIT: 100 INJECTION, SOLUTION INTRAVENOUS; SUBCUTANEOUS at 21:30

## 2020-10-26 RX ADMIN — INSULIN LISPRO PRN UNIT: 100 INJECTION, SOLUTION INTRAVENOUS; SUBCUTANEOUS at 17:32

## 2020-10-26 RX ADMIN — VANCOMYCIN SCH MLS/HR: 1 INJECTION, SOLUTION INTRAVENOUS at 17:21

## 2020-10-26 RX ADMIN — ENOXAPARIN SODIUM SCH: 100 INJECTION SUBCUTANEOUS at 10:13

## 2020-10-26 RX ADMIN — CEFEPIME HYDROCHLORIDE SCH MLS/HR: 2 INJECTION, POWDER, FOR SOLUTION INTRAVENOUS at 19:01

## 2020-10-26 RX ADMIN — NICOTINE SCH MG: 21 PATCH, EXTENDED RELEASE TRANSDERMAL at 14:52

## 2020-10-26 RX ADMIN — INSULIN GLARGINE SCH UNIT: 100 INJECTION, SOLUTION SUBCUTANEOUS at 21:30

## 2020-10-26 RX ADMIN — HYDROCODONE BITARTRATE AND ACETAMINOPHEN PRN TAB: 5; 325 TABLET ORAL at 20:19

## 2020-10-26 RX ADMIN — CEFEPIME HYDROCHLORIDE SCH MLS/HR: 2 INJECTION, POWDER, FOR SOLUTION INTRAVENOUS at 12:43

## 2020-10-26 RX ADMIN — FAMOTIDINE SCH MG: 10 INJECTION INTRAVENOUS at 21:30

## 2020-10-26 RX ADMIN — VANCOMYCIN SCH MLS/HR: 1 INJECTION, SOLUTION INTRAVENOUS at 23:10

## 2020-10-26 RX ADMIN — VANCOMYCIN SCH MLS/HR: 1 INJECTION, SOLUTION INTRAVENOUS at 08:22

## 2020-10-26 RX ADMIN — CEFEPIME HYDROCHLORIDE SCH MLS/HR: 2 INJECTION, POWDER, FOR SOLUTION INTRAVENOUS at 05:12

## 2020-10-26 RX ADMIN — HYDROCODONE BITARTRATE AND ACETAMINOPHEN PRN TAB: 5; 325 TABLET ORAL at 14:55

## 2020-10-26 RX ADMIN — FAMOTIDINE SCH MG: 10 INJECTION INTRAVENOUS at 08:59

## 2020-10-26 NOTE — PCM.NOTE
Date and Time: 10/26/20  1835








Objective Exam


Wound Assessment: 


                              Skin/Wound Assessment





Wound/Incision Assessment                                  Start:  10/25/20 

01:02


Text:                                                      Status: Active       




Freq:   Q6H                                                                     




Protocol:                                                                       




 Document     10/26/20 14:00  AR  (Rec: 10/26/20 15:11  AR  [id number])


 Wound/Incision Assessment


     Left Other


      Wound Assessment                           Shift Assessment


      Wound Type                                 CELLULITIS


      Drainage Amount                            Minimal


      Drainage Description                       Serosanguineous


      Drainage Odor                              None/Absent


      General Appearance                         Open to air,Reddened,Draining











OBJECTIVE DATA


Vital Signs: 


                               Vital Signs - 24 hr











  Temp Pulse Resp BP Pulse Ox


 


 10/26/20 16:00  97.7 F  73  22  120/72  98


 


 10/26/20 12:00  98.2 F  73  20  122/55  96


 


 10/26/20 07:18  97.8 F  80  18  120/76  95


 


 10/26/20 04:00  98.1 F  82  16  115/58  96


 


 10/25/20 23:54  97.8 F  86  16  130/88  97


 


 10/25/20 20:00  97.9 F  90  18  133/96  97








                        Pain Assessment - Last Documented











Pain Intensity                 0


 


Pain Scale Used                0-10 Pain Scale











Intake and Output: 


                                 Intake & Output











 10/24/20 10/25/20 10/26/20 10/27/20





 11:59 11:59 11:59 11:59


 


Intake Total  685 1859 240


 


Output Total  300 1000 


 


Balance  385 859 240


 


Weight  105.4 kg 105.3 kg 











Lab Results: 


                            Lab Results-Last 24 Hours











  10/25/20 10/26/20 10/26/20 Range/Units





  20:57 06:55 07:16 


 


WBC    6.6  (4.0-10.5)  K/mm3


 


RBC    4.48  (4.1-5.4)  M/mm3


 


Hgb    12.6  (12.0-16.0)  gm/dl


 


Hct    39.9  (35-47)  %


 


MCV    89.1  ()  fl


 


MCH    28.1  (26-32)  pg


 


MCHC    31.6 L  (32-36)  g/dl


 


RDW    13.6  (11.5-14.0)  %


 


Plt Count    216  (150-450)  K/mm3


 


MPV    9.2  (7.5-11.0)  fl


 


Gran %    60.3  (36.0-66.0)  %


 


Eos # (Auto)    0.18  (0-0.5)  


 


Absolute Lymphs (auto)    1.77  (1.0-4.6)  


 


Absolute Monos (auto)    0.60  (0.0-1.3)  


 


Lymphocytes %    27.0  (24.0-44.0)  %


 


Monocytes %    9.2  (0.0-12.0)  %


 


Eosinophils %    2.7  (0.00-5.0)  %


 


Basophils %    0.8  (0.0-0.4)  %


 


Absolute Granulocytes    3.95  (1.4-6.9)  


 


Basophils #    0.05  (0-0.4)  


 


Sodium     (137-145)  mmol/L


 


Potassium     (3.5-5.1)  mmol/L


 


Chloride     ()  mmol/L


 


Carbon Dioxide     (22-30)  mmol/L


 


Anion Gap     (5-15)  MEQ/L


 


BUN     (7-17)  mg/dL


 


Creatinine     (0.52-1.04)  mg/dL


 


Estimated GFR     ML/MIN


 


Glucose     ()  mg/dL


 


POC Glucometer  242 H  307 H   (74 to 106)  mg/dL


 


Calcium     (8.4-10.2)  mg/dL














  10/26/20 10/26/20 10/26/20 Range/Units





  07:16 11:19 15:57 


 


WBC     (4.0-10.5)  K/mm3


 


RBC     (4.1-5.4)  M/mm3


 


Hgb     (12.0-16.0)  gm/dl


 


Hct     (35-47)  %


 


MCV     ()  fl


 


MCH     (26-32)  pg


 


MCHC     (32-36)  g/dl


 


RDW     (11.5-14.0)  %


 


Plt Count     (150-450)  K/mm3


 


MPV     (7.5-11.0)  fl


 


Gran %     (36.0-66.0)  %


 


Eos # (Auto)     (0-0.5)  


 


Absolute Lymphs (auto)     (1.0-4.6)  


 


Absolute Monos (auto)     (0.0-1.3)  


 


Lymphocytes %     (24.0-44.0)  %


 


Monocytes %     (0.0-12.0)  %


 


Eosinophils %     (0.00-5.0)  %


 


Basophils %     (0.0-0.4)  %


 


Absolute Granulocytes     (1.4-6.9)  


 


Basophils #     (0-0.4)  


 


Sodium  134 L    (137-145)  mmol/L


 


Potassium  4.1    (3.5-5.1)  mmol/L


 


Chloride  103    ()  mmol/L


 


Carbon Dioxide  24    (22-30)  mmol/L


 


Anion Gap  11.0    (5-15)  MEQ/L


 


BUN  4 L    (7-17)  mg/dL


 


Creatinine  0.54    (0.52-1.04)  mg/dL


 


Estimated GFR  > 60.0    ML/MIN


 


Glucose  318 H    ()  mg/dL


 


POC Glucometer   252 H  255 H  (74 to 106)  mg/dL


 


Calcium  8.7    (8.4-10.2)  mg/dL











Radiology Exams: 


                              Radiology Procedures











 Category Date Time Status


 


 PELVIS WITH CONTRAST [CT] Stat Exams  10/24/20 21:45 Completed











Multi-Disciplinary Progress Notes: 


                        Multi-Disciplinary Progress Notes





10/26/20 11:13 Case Management Note by Shweta Fairbanks REPORTS PATIENT NOW HAS PRESUMPTIVE MEDICAID. PATIENT'S PHARMACY OF

 CHOICE CHANGED TO [facility]  (THEY HONOR THE PRESUMPTIVE LETTER).





NOTE REGARDING CHEAP INSULIN AT [facility] REMOVED FROM CHART BUT WILL GIVE 

INFORMATION TO PATIENT IN CASE SHE LOSES COVERAGE IN THE FUTURE





Initialized on 10/26/20 11:13 - END OF NOTE








10/26/20 10:11 Case Management Note by Shweta Fairbanks





Addendum entered by Shweta Fairbanks  10/26/20 10:28: 





EMAIL ALREADY SENT TO [facility] THIS AM. WILL GIVE PATIENT RX DISCOUNT CARD 

AS WELL





Original Note:





PATIENT DOES NOT QUALIFY FOR MEDICAID AND STATES SHE CAN ALSO NOT AFFORD THE 

INSURANCE THRU HER WORK. I S/W HER ABOUT THE COST OF LANTUS (>300 PER CARTON) 

SHE STATED THIS WAS ALSO NOT AFFORDABLE. I CALLED AND S/W [facility] PHARMACIST HE 

REPORTS THE CHEAPEST INSULINS THEY HAVE ARE AS FOLLOWS:





RELION NOVOLIN N VIAL


RELION NOVOLIN R VIAL 


RELION NOVOLIN 70/30 (APPEARS TO COME IN VIAL OR PEN)





ALL VIALS ARE $24.88 FOR 10 ML (1,000 UNITS)


NEEDLES ARE $12.58 FOR A BOX 





WILL PLACE THIS NOTE ON FRONT OF CHART FOR MD TO SEE 








Initialized on 10/26/20 10:11 - END OF NOTE

## 2020-10-27 VITALS — DIASTOLIC BLOOD PRESSURE: 100 MMHG | OXYGEN SATURATION: 98 % | HEART RATE: 80 BPM | SYSTOLIC BLOOD PRESSURE: 151 MMHG

## 2020-10-27 RX ADMIN — INSULIN LISPRO PRN UNIT: 100 INJECTION, SOLUTION INTRAVENOUS; SUBCUTANEOUS at 12:54

## 2020-10-27 RX ADMIN — CEFEPIME HYDROCHLORIDE SCH MLS/HR: 2 INJECTION, POWDER, FOR SOLUTION INTRAVENOUS at 12:50

## 2020-10-27 RX ADMIN — FAMOTIDINE SCH MG: 10 INJECTION INTRAVENOUS at 09:35

## 2020-10-27 RX ADMIN — HYDROCODONE BITARTRATE AND ACETAMINOPHEN PRN TAB: 5; 325 TABLET ORAL at 08:56

## 2020-10-27 RX ADMIN — VANCOMYCIN SCH MLS/HR: 1 INJECTION, SOLUTION INTRAVENOUS at 08:44

## 2020-10-27 RX ADMIN — ENOXAPARIN SODIUM SCH: 100 INJECTION SUBCUTANEOUS at 08:58

## 2020-10-27 RX ADMIN — CEFEPIME HYDROCHLORIDE SCH MLS/HR: 2 INJECTION, POWDER, FOR SOLUTION INTRAVENOUS at 00:46

## 2020-10-27 RX ADMIN — CEFEPIME HYDROCHLORIDE SCH MLS/HR: 2 INJECTION, POWDER, FOR SOLUTION INTRAVENOUS at 05:41

## 2020-10-27 RX ADMIN — NICOTINE SCH: 21 PATCH, EXTENDED RELEASE TRANSDERMAL at 08:57

## 2020-10-27 RX ADMIN — CEFEPIME HYDROCHLORIDE SCH MLS/HR: 2 INJECTION, POWDER, FOR SOLUTION INTRAVENOUS at 18:10

## 2020-10-27 RX ADMIN — INSULIN LISPRO PRN UNIT: 100 INJECTION, SOLUTION INTRAVENOUS; SUBCUTANEOUS at 08:51

## 2020-10-27 RX ADMIN — INSULIN LISPRO PRN UNIT: 100 INJECTION, SOLUTION INTRAVENOUS; SUBCUTANEOUS at 16:00

## 2020-10-27 NOTE — PCM.DS
Discharge Summary


Date of Admission: 


10/25/20 00:55





Date of Discharge: 


10/27/20





Admitting Physician: 


LEATHA MOYA DO





Consults: 





                                Consults on Case





10/26/20 06:24


Consult Surgery ROUTINE 





10/26/20 11:15


Consult Physician ROUTINE 











Primary Care Provider: 


LEATHA MOYA DO








Allergies


Allergies





tomato Allergy (Verified 10/24/20 21:25)


   











Hospital Summary





- Hospital Course


Hospital Course: 


40 years old morbidly obese female with a history of diabetes mellitus presented

to the ER with pubic swelling which she noticed 2 days ago and is gradually 

worsening.  Patient report initially it was a pea size currently involves the 

whole left half of pubic area associated with moderate intensity sharp throbbing

pain which gets worse with movement and palpation and better with taking 

over-the-counter pain medication.  She denies associated fever or chills.  She 

tried to squeeze it with minimal discharge earlier.  Denies any difficulty 

urination or abdominal/pelvic pain.  Denies history of MRSA.  Blood sugar is 

fairly controlled per patient. Patient was admitted for cellulitis. Patient was 

started on vanc and zosyn. Blood cultures were obtained but no wound culture was

obtained prior to starting antibiotics. Patient was also found to have poor 

dentition upon admission. Patient has given multiple medical histories so unsure

what is accurate. Patient reported to ER doctor that she had not had MRSA in the

past but reported to myself and other staff that she has had MRSA and that she 

is likely colonized. She also reported well controlled diabetes to ER doctor and

admitting physician however patient reported to me that she has not had any of 

her diabetic medications because she has been without insurance and couldnt 

afford her medications. Admitting physician was concerned about nec fascitis 

because of use of one of the diabetic meds. Surgery was consulted and consulted 

GYN. Patient had been on IV antibiotics for 1-2 days and wanted to go home. I 

discussed that this would be against medical advice as she could not tolerate 

the pain and due to concern for poor compliance. Surgery was not recommended by 

the specialist so medical management was continued.  Patient's pain has improved

and area of erythema has shrunk from line of demarcation. Underlying abscess was

draining. Patient again requested to go home. Patient was set up with 

presumptive medicaid. Patient will be discharged on clinda to cover for 

suspected MRSA. She will be given a short course of pain medication. She will h

ave diabetic meds sent in as well as meter and strips for her to start managing 

her diabetes. 








- Vitals & Intake/Output


Vital Signs: 





                                   Vital Signs











Temperature  98.0 F   10/27/20 12:00


 


Pulse Rate  85   10/27/20 12:00


 


Respiratory Rate  20   10/27/20 12:00


 


Blood Pressure  113/56   10/27/20 12:00


 


O2 Sat by Pulse Oximetry  94 L  10/27/20 12:00











Intake & Output: 





                                 Intake & Output











 10/25/20 10/26/20 10/27/20 10/28/20





 11:59 11:59 11:59 11:59


 


Intake Total 685 1859 600 


 


Output Total 300 1000  


 


Balance 385 859 600 


 


Weight 105.4 kg 105.3 kg  














- Lab


Result Diagrams: 


                                 10/26/20 07:16





                                 10/26/20 07:16


Lab Results-Last 24 Hrs: 





                            Lab Results-Last 24 Hours











  10/26/20 10/26/20 10/27/20 Range/Units





  15:57 21:22 07:20 


 


POC Glucometer  255 H  234 H  168 H  (74 to 106)  mg/dL


 


Vancomycin Trough     (10-20)  ug/mL














  10/27/20 10/27/20 Range/Units





  07:33 11:45 


 


POC Glucometer   289 H  (74 to 106)  mg/dL


 


Vancomycin Trough  6.61 L   (10-20)  ug/mL











Micro Results-Entire Visit: 





                                  Microbiology











 10/24/20 22:50 Blood Culture - Preliminary





 Blood    NO GROWTH TO DATE


 


 10/24/20 22:15 Blood Culture - Preliminary





 Blood    NO GROWTH TO DATE








                                   Accuchecks











Date                           10/27/20


 


Date                           10/27/20


 


Date                           10/26/20


 


Time                           11:30


 


Time                           07:22


 


Time                           22:00

















Discharge Exam


General Appearance: mild distress, anxiety, obese, other (Patient appears older 

than stated age. Disheveled)


Neurologic Exam: alert, oriented x 3, cooperative, No normal mood/affect, No d

epressed mood/affect


Eye Exam: No scleral icterus


Ears, Nose, Throat Exam: moist mucous membranes, other (poor dentition)


Neck Exam: normal inspection


Respiratory Exam: normal breath sounds, lungs clear, No respiratory distress, No

diminished breath sounds


Cardiovascular Exam: regular rate/rhythm, normal heart sounds, No murmur, No 

friction rub, No gallop


Gastrointestinal/Abdomen Exam: soft, normal bowel sounds, No tenderness, No 

distention, No mass, No guarding


Pelvic Exam: other (erythema and tenderness mons pubis and upper left labia. 

There is a palpable fluid collection approx 2 cm in size. There is some skin 

break down over the fluid collection. Less tender to palpation then yesterday's 

exam.)


Extremity Exam: No pedal edema, No swelling, No tenderness


Skin Exam: normal color, warm, dry


Wound Assessment: 





                              Skin/Wound Assessment





Wound/Incision Assessment                                  Start:  10/25/20 

01:02


Text:                                                      Status: Active       




Freq:   Q6H                                                                     




Protocol:                                                                       




 Document     10/27/20 08:00  [facility]  (Rec: 10/27/20 09:31  [facility]  [id number][facility]


 Wound/Incision Assessment


     Left Other


      Wound Assessment                           Shift Assessment


      Wound Type                                 CELLULITIS


      Drainage Amount                            Minimal


      Drainage Description                       Serosanguineous


      Drainage Odor                              None/Absent


      General Appearance                         Open to air,Reddened,Draining


      Primary Dressing                           mesh panty with pad











Final Diagnosis/Problem List





- Final Discharge Diagnosis/Problem


(1) Left genital labial abscess


Current Visit: Yes   Status: Acute   


Assessment & Plan: 


Likely MRSA infection. Patient has been on vanc and zosyn. Symptoms have 

improved. WBC has trended down. Patient will be discharged on clinda. She will 

be on this for 7 days. She will not be discharged until she has had her evening 

dose of antibiotics. She will get a short course of pain medication. Culture was

obtained today. Will adjust antibiotics if necessary 


Code(s): N76.4 - ABSCESS OF VULVA   





(2) Cellulitis of pubic region


Current Visit: Yes   Status: Acute   


Assessment & Plan: 


Patient's erythema has shrunk down from original line of demarcation. Same plan 

as above


Code(s): L03.319 - CELLULITIS OF TRUNK, UNSPECIFIED   





(3) Tooth decay


Current Visit: Yes   Status: Chronic   


Assessment & Plan: 


Patient likely needs teeth pulled. She will need to address this after discharge


Code(s): K02.9 - DENTAL CARIES, UNSPECIFIED   





(4) Type 2 diabetes mellitus


Current Visit: No   Status: Chronic   


Assessment & Plan: 


Patient has not been taking her medications as reported by her. She will start 

on metformin and long acting insulin. Her sugars have been poorly controlled due

to poor compliance and exacerbated by current infection. Meter and supplies sent

to pharmacy








(5) Smoker


Current Visit: Yes   Status: Acute   


Assessment & Plan: 


Discussed with patient smoking cessation. Recommended to patient that she could 

use the money for her medications as opposed to spending on cigarettes. 


Code(s): F17.200 - NICOTINE DEPENDENCE, UNSPECIFIED, UNCOMPLICATED   





- Discharge


Disposition: Home, Self-Care


Condition: Stable


Prescriptions: 


New


   Blood-Glucose Meter [Accu-Chek Aure Plus] 1 each MC TID #1 each


   Lancets 1 each MC TID #1 each


   Blood Sugar Diagnostic [Test Strips] 1 each MC TID #1 box


   Needles, Insulin Disposable [Bd Ultra Fine Ximena] 1 each MC HS #30 dis.needle


   Insulin Glargine,Hum.rec.anlog [Lantus Solostar] 15 unit SQ HS #1 ml


   Clindamycin HCl 300 mg PO Q6H 7 Days #28 capsule


   Metformin HCl [Metformin HCl ER] 750 mg PO DAILY 30 Days #30 tab.er.24h


   Hydrocodone/APAP 5-325 Tab^^^ [Norco 5-325 Tablet^^^] 1 tab PO Q6HPRN PRN 3 

Days #10 tablet MDD 6


     PRN Reason: Pain


Instructions:  Blood Glucose Monitoring, Cellulitis (Skin Infection), Adult 

(DC), Insulin Glargine


Additional Instructions: 


TAKE YOUR PRESUMPTIVE MEDICAID PAPER WITH YOU TO THE [facility] PHARMACY TO GET YOUR 

MEDICATIONS.  THEY WILL NEED THAT PAPER TO FILL YOUR PRESCRIPTIONS.


Follow up with: 


LEATHA MOYA DO [Primary Care Provider] - 11/13/20 10:00 am


SURAJ DUGGAN DO [ACTIVE STAFF] - 11/11/20 4:00 pm


Forms:  Work/School Release Form

## 2021-10-30 ENCOUNTER — HOSPITAL ENCOUNTER (EMERGENCY)
Dept: HOSPITAL 33 - ED | Age: 41
Discharge: ANOTHER HOSPITAL | End: 2021-10-30
Payer: COMMERCIAL

## 2021-10-30 VITALS — DIASTOLIC BLOOD PRESSURE: 88 MMHG | SYSTOLIC BLOOD PRESSURE: 138 MMHG | OXYGEN SATURATION: 98 % | HEART RATE: 88 BPM
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
## 2021-10-30 DIAGNOSIS — S53.401A: Primary | ICD-10-CM
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
## 2021-10-30 PROCEDURE — 73080 X-RAY EXAM OF ELBOW: CPT

## 2021-10-30 PROCEDURE — 99283 EMERGENCY DEPT VISIT LOW MDM: CPT

## 2021-10-30 NOTE — XRAY
Indication: Pain after lifting.



Comparison: None



3 view right elbow obtained.  No bony, articular, or soft tissue abnormalities.

## 2021-10-30 NOTE — ERPHSYRPT
- History of Present Illness


Time Seen by Provider: 10/30/21 08:08


Source: patient, family


Exam Limitations: no limitations


Patient Subjective Stated Complaint: pt here for pain to right elbow after 

lifting on a trash bag


Triage Nursing Assessment: pt alert, resp easy, ksin w/p/d. no swelling noted to

elbow, has strong pulse


Physician History: 





41 years old female presented to the ER with chief complaint of right elbow pain

since yesterday after she picked up a trash bag and felt a popping sound in the 

right elbow.  Since then she is having moderate to severe sharp pain with 

minimal movements of right elbow radiating down to the wrist and fingers, 

aggravated with movements of fingers as well with some swelling around the 

elbow.  No difficulty movements of right shoulder.  No numbness of the fingers. 

No history of previous elbow surgeries.


Occurred: yesterday


Method of Injury: other


Quality: sharpness


Severity of Pain-Max: severe


Severity of Pain-Current: moderate


Extremities Pain Location: elbow: right


Modifying Factors: Improves With: cold therapy, immobilization, rest.  Worsens 

With: movement


Associated Symptoms: none


Allergies/Adverse Reactions: 








tomato Allergy (Verified 10/30/21 07:22)


   





Hx Tetanus, Diphtheria Vaccination/Date Given: Yes


Hx Influenza Vaccination/Date Given: No


Hx Pneumococcal Vaccination/Date Given: No


Immunizations Up to Date: Yes





Travel Risk





- International Travel


Have you traveled outside of the country in past 3 weeks: No





- Coronavirus Screening


Are you exhibiting any of the following symptoms?: No


Close contact with a COVID-19 positive Pt in past 14-21 Days: No





- Vaccine Status


Have you recieved a Covid-19 vaccination: No





- Review of Systems


Constitutional: No Symptoms


Ears, Nose, & Throat: No Symptoms


Respiratory: No Symptoms


Cardiac: No Symptoms


Abdominal/Gastrointestinal: No Symptoms


Genitourinary Symptoms: No Symptoms


Musculoskeletal: Joint Pain


Skin: No Symptoms


Neurological: No Symptoms


Psychological: No Symptoms


Endocrine: No Symptoms


Hematologic/Lymphatic: No Symptoms





- Past Medical History


Pertinent Past Medical History: Yes


Neurological History: No Pertinent History


ENT History: No Pertinent History


Cardiac History: High Cholesterol


Respiratory History: No Pertinent History


Endocrine Medical History: Diabetes Type II


Musculoskeletal History: Other


GI Medical History: No Pertinent History


 History: No Pertinent History


Psycho-Social History: No Pertinent History


Female Reproductive Disorders: No Pertinent History


Other Medical History: PT. IS A SMOKER NAD HAS HX CHRONIC HIP PN. TUBAL 

LIGATION, AND ORAL SURGERY; HX MARIJUANA USE





- Past Surgical History


Past Surgical History: Yes


Neuro Surgical History: No Pertinent History


Cardiac: No Pertinent History


Respiratory: No Pertinent History


Gastrointestinal: No Pertinent History


Genitourinary: No Pertinent History


Musculoskeletal: No Pertinent History


Female Surgical History: Tubal Ligation


Other Surgical History: oral surgery, ear drum cauterization,





- Social History


Smoking Status: Current every day smoker


How long have you smoked: 22 yrs


Exposure to second hand smoke: Yes


Drug Use: marijuana


Patient Lives Alone: Yes


Significant Family History: hypertension (mother)





- Female History


Hx Last Menstrual Period: oct


Hx Pregnant Now: No





- Nursing Vital Signs


Nursing Vital Signs: 





                               Initial Vital Signs











Temperature  97.0 F   10/30/21 07:15


 


Pulse Rate  95 H  10/30/21 07:15


 


Respiratory Rate  18   10/30/21 07:15


 


Blood Pressure  167/97   10/30/21 07:15


 


O2 Sat by Pulse Oximetry  99   10/30/21 07:15








                                   Pain Scale











Pain Intensity                 2

















- Physical Exam


General Appearance: no apparent distress, alert


Eyes, Ears, Nose, Throat Exam: normal ENT inspection


Neck Exam: normal inspection


Cardiovascular/Respiratory Exam: normal breath sounds, regular rate/rhythm


Shoulder Exam: normal inspection, non-tender, no evidence of injury, normal ROM


Elbow/Forearm Exam: bone tenderness (Minimal bony tenderness right elbow with 

limited range of motion in all direction and some soft tissue swelling the 

proximal forearm.  Distal neurovascular well intact.)


Wrist Exam: normal inspection, non-tender, no evidence of injury, normal ROM


Hand Exam: normal inspection, non-tender, no evidence of injury, normal ROM


Neuro/Tendon Exam: normal sensation


Mental Status Exam: alert, oriented x 3


Skin Exam: normal color


**SpO2 Interpretation**: normal


SpO2: 99


O2 Delivery: Room Air


Ordered Tests: 





                               Active Orders 24 hr











 Category Date Time Status


 


 ELBOW (MINIMUM 3 VIEWS) Stat Exams  10/30/21 07:32 Taken








Medication Summary














Discontinued Medications














Generic Name Dose Route Start Last Admin





  Trade Name Freq  PRN Reason Stop Dose Admin


 


Oxycodone/Acetaminophen  1 tab  10/30/21 08:09  10/30/21 08:14





  Oxycodone Hcl/Apap 5 Mg/325 Mg Tablet  PO  10/30/21 08:10  1 tab





  STAT ONE   Administration


 


Oxycodone/Acetaminophen  Confirm  10/30/21 08:14 





  Oxycodone Hcl/Apap 5 Mg/325 Mg Tablet  Administered  10/30/21 08:15 





  Dose  





  1 tab  





  .ROUTE  





  .STK-MED ONE  














- Progress


Progress: pain not gone completely


Progress Note: 





10/30/21 08:21


She is given symptomatic treatment for pain.  X-rays did not reveal any obvious 

fracture dislocation reviewed by me, official report is pending.  I believe 

patient has ligamentous injury.  Placed in a sling, NSAIDs, applying ice and out

patient orthopedic follow up recommended.





Counseled pt/family regarding: diagnosis, need for follow-up, rad results





- Departure


Departure Disposition: Home


Clinical Impression: 


Elbow sprain


Qualifiers:


 Encounter type: initial encounter Laterality: right Qualified Code(s): S53.401A

- Unspecified sprain of right elbow, initial encounter





Condition: Stable


Critical Care Time: No


Referrals: 


PAPO FLOWER [Primary Care Provider] - 


[facility] - MIKE LEROY NP [NON-STAFF PHY W/O PRIVILEGES] -  (Monday morning 

for reevaluation)


RENAY ALVAREZ MD [NON-STAFF PHY W/O PRIVILEGES] -  (Monday morning for 

reevaluation)


Instructions:  Elbow Sprain (DC)


Additional Instructions: 


Apply intermittent ice.  Take pain medications as needed.  Avoid exertional 

activities with right extremity.  Follow-up with primary care for reevaluation. 

Return to ER for worsening pain swelling or difficulty with wrist/hand/numbness 

weakness of right upper extremity.


Prescriptions: 


Hydrocodone/APAP 5/325*** [Norco 5/325 mg***] 1 each PO Q6H PRN PRN #10 tablet 

MDD 4


 PRN Reason: Pain

## 2023-04-11 ENCOUNTER — HOSPITAL ENCOUNTER (EMERGENCY)
Dept: HOSPITAL 33 - ED | Age: 43
Discharge: HOME | End: 2023-04-11
Payer: COMMERCIAL

## 2023-04-11 VITALS — DIASTOLIC BLOOD PRESSURE: 90 MMHG | SYSTOLIC BLOOD PRESSURE: 122 MMHG | HEART RATE: 114 BPM | OXYGEN SATURATION: 98 %

## 2023-04-11 DIAGNOSIS — Z72.0: ICD-10-CM

## 2023-04-11 DIAGNOSIS — E78.5: ICD-10-CM

## 2023-04-11 DIAGNOSIS — S39.012A: Primary | ICD-10-CM

## 2023-04-11 DIAGNOSIS — E11.9: ICD-10-CM

## 2023-04-11 DIAGNOSIS — Z28.310: ICD-10-CM

## 2023-04-11 LAB
BACTERIA UR CULT: NO
GLUCOSE UR-MCNC: >=1000 MG/DL
RBC # URNS HPF: (no result) /HPF (ref 0–5)
WBC URNS QL MICRO: (no result) /HPF (ref 0–5)

## 2023-04-11 PROCEDURE — 99283 EMERGENCY DEPT VISIT LOW MDM: CPT

## 2023-04-11 PROCEDURE — 96372 THER/PROPH/DIAG INJ SC/IM: CPT

## 2023-04-11 PROCEDURE — 72131 CT LUMBAR SPINE W/O DYE: CPT

## 2023-04-11 PROCEDURE — 81001 URINALYSIS AUTO W/SCOPE: CPT

## 2023-04-11 NOTE — XRAY
Indication: Low back pain.  No known injury.



Multiple contiguous axial images obtained through the lumbar spine.  Sagittal

and coronal reformatted images obtained.



Comparison: May 7, 2018



Axial images again demonstrates L5-S1 broad-based disc bulge producing

bilateral foraminal narrowing.  Remaining levels negative for acute fracture,

suspicious bony lesions, or spinal canal stenosis.  Facets are symmetric.



Sagittal and coronal reformatted images demonstrates normal lumbar alignment

stable L5-S1 disc space loss with vacuum disc phenomena.  No acute compression

fracture or subluxation.



Visualized noncontrasted soft tissues demonstrate minimal aortic

calcifications without AAA.



Impression: Stable L5-S1 degenerative disc disease.  Remaining CT lumbar spine

is negative.

## 2023-04-11 NOTE — ERPHSYRPT
- History of Present Illness


Source: patient


Exam Limitations: no limitations


Patient Subjective Stated Complaint: C/O lower back pain that started 2 days 

ago; increasingly worse


Triage Nursing Assessment: Patient ambulated back to ER with slow gait holding 

her lower back on both sides with hands. She is tearful. No SOB. She is alert 

and oriented. Denies fall or injuries.


Physician History: 





42 yo Wf w Lumbar pain x 2 days. Pain is 6/10 and worse w movement. It is 

described as dull at rest and sharp when moving. She denies 

trauma/dysuria/hematuria/radiation/incontinence/focal weakness.


Timing/Duration: day(s) (2 days)


Method of Injury: other (No injury)


Quality: dull, sharp


Back Pain Location: lumbar spine


Severity of Pain-Max: severe


Severity of Pain-Current: moderate


Modifying Factors: Improves With: movement


Associated Symptoms: denies symptoms


Allergies/Adverse Reactions: 








tomato Allergy (Verified 04/11/23 13:31)


   





Hx Tetanus, Diphtheria Vaccination/Date Given: Yes


Hx Influenza Vaccination/Date Given: No


Hx Pneumococcal Vaccination/Date Given: No


Immunizations Up to Date: Yes





Travel Risk





- International Travel


Have you traveled outside of the country in past 3 weeks: No





- Coronavirus Screening


Are you exhibiting any of the following symptoms?: No


Close contact with a COVID-19 positive Pt in past 14-21 Days: No





- Vaccine Status


Have you recieved a Covid-19 vaccination: No





- Review of Systems


Constitutional: No Symptoms


Eyes: No Symptoms


Ears, Nose, & Throat: No Symptoms


Respiratory: No Symptoms


Cardiac: No Symptoms


Abdominal/Gastrointestinal: No Symptoms


Genitourinary Symptoms: No Symptoms


Musculoskeletal: Back Pain


Skin: No Symptoms


Neurological: No Symptoms


Psychological: No Symptoms


Endocrine: No Symptoms


Hematologic/Lymphatic: No Symptoms


Immunological/Allergic: No Symptoms





- Past Medical History


Pertinent Past Medical History: Yes


Neurological History: No Pertinent History


ENT History: No Pertinent History


Cardiac History: High Cholesterol, Other


Respiratory History: No Pertinent History


Endocrine Medical History: Diabetes Type II


Musculoskeletal History: Rheumatoid Arthritis


GI Medical History: No Pertinent History


 History: No Pertinent History


Psycho-Social History: No Pertinent History


Female Reproductive Disorders: No Pertinent History


Other Medical History: "Minor Cardiac Event" , Multiple Broken Noses





- Past Surgical History


Past Surgical History: Yes


Neuro Surgical History: No Pertinent History


Cardiac: No Pertinent History


Respiratory: No Pertinent History


Gastrointestinal: No Pertinent History


Genitourinary: No Pertinent History


Musculoskeletal: No Pertinent History


Female Surgical History: Tubal Ligation


Other Surgical History: oral surgery, Surgical removal of impacted teeth, Ear 

drum cauterization x3 (ages 6-11 years)





- Social History


Smoking Status: Current every day smoker


How long have you smoked: 29 years


Exposure to second hand smoke: Yes


Drug Use: marijuana


Patient Lives Alone: No


Significant Family History: hypertension (mother)





- Female History


Hx Pregnant Now: No (tubal)





- Nursing Vital Signs


Nursing Vital Signs: 


                               Initial Vital Signs











Temperature  96.9 F   04/11/23 13:32


 


Pulse Rate  129 H  04/11/23 13:32


 


Respiratory Rate  24   04/11/23 13:32


 


Blood Pressure  118/81   04/11/23 13:32


 


O2 Sat by Pulse Oximetry  98   04/11/23 13:32








                                   Pain Scale











Pain Intensity []              10


 


Pain Intensity                 6











Tachy





- Physical Exam


General Appearance: no apparent distress (In pain)


Eye Exam: PERRL/EOMI, eyes nml inspection


Ears, Nose, Throat Exam: normal ENT inspection, TMs normal, pharynx normal, 

moist mucous membranes


Neck Exam: normal inspection, non-tender, supple, full range of motion, No 

meningismus, No mass, No Brudzinski, No Kernig's, No carotid bruit


Respiratory Exam: normal breath sounds, lungs clear, airway intact, No 

respiratory distress


Cardiovascular Exam: tachycardia, capillary refill <2 sec, No murmur


Gastrointestinal Exam: soft, normal bowel sounds, No tenderness


Back Exam: vertebral tenderness (L-spine TTP), No CVA tenderness


Extremity Exam: normal inspection, normal range of motion, pelvis stable


Peripheral Pulses: carotid (R): 2+, carotid (L): 2+


Neurologic Exam: alert, oriented x 3, cooperative, CNs II-XII nml as tested, 

normal mood/affect, sensation nml, No motor deficits, No sensory deficit


Skin Exam: normal color, warm, dry, No rash


Lymphatic Exam: No adenopathy


**SpO2 Interpretation**: normal


SpO2: 98


O2 Delivery: Room Air





- Course


Nursing assessment & vital signs reviewed: Yes





- CT Exams


  ** Lumbar Spine


CT Interpretation: Discussed w/radiologist (L5-S1 DDD)


Ordered Tests: 


                               Active Orders 24 hr











 Category Date Time Status


 


 LUMBAR SPINE W/O [CT] Stat Exams  04/11/23 14:26 Completed


 


 UA W/RFX UR CULTURE Stat Lab  04/11/23 13:42 Completed








Medication Summary














Discontinued Medications














Generic Name Dose Route Start Last Admin





  Trade Name Avis  PRN Reason Stop Dose Admin


 


Ketorolac Tromethamine  30 mg  04/11/23 13:41  04/11/23 13:44





  Ketorolac Tromethamine 30 Mg/Ml Inj  IM  04/11/23 13:42  30 mg





  STAT ONE   Administration


 


Ketorolac Tromethamine  Confirm  04/11/23 13:44 





  Ketorolac Tromethamine 30 Mg/Ml Inj  Administered  04/11/23 13:45 





  Dose  





  30 mg  





  .ROUTE  





  .STK-MED ONE  











Lab/Rad Data: 


                               Laboratory Results











  04/11/23 Range/Units





  13:42 


 


Urine Color  Yellow  (Yellow)  


 


Urine Appearance  Clear  (Clear)  


 


Urine pH  5.5  (4.6-8.0)  


 


Ur Specific Gravity  >=1.030 A  (1.005-1.030)  


 


Urine Protein  Trace A  (Negative)  


 


Urine Glucose (UA)  >=1000 A  (Negative)  mg/dL


 


Urine Ketones  15 A  (Negative)  


 


Urine Blood  Negative  (Negative)  


 


Urine Nitrite  Negative  (Negative)  


 


Urine Bilirubin  Negative  (Negative)  


 


Urine Urobilinogen  0.2  (0.2)  mg/dL


 


Ur Leukocyte Esterase  Negative  (Negative)  


 


U Hyaline Cast (Auto)  NONE SEEN  (0-2)  /LPF


 


Urine Microscopic RBC  0-2  (0-5)  /HPF


 


Urine Microscopic WBC  3-5  (0-5)  /HPF


 


Ur Epithelial Cells  Rare  (None Seen)  /HPF


 


Urine Bacteria  Few A  (None Seen)  /HPF


 


Urine Culture Reflexed  NO  (NO)  














- Progress


Progress: improved


Progress Note: 





04/11/23 17:06


Nursing note and vital signs reviewed


No food or housing insecurities noted


30mg IM Toradol w mild relief in pain


Lab result reviewed and shared w pt


CT result reviewed and shared w pt


Counseled pt/family regarding: diagnosis, need for follow-up, rad results





Medical Desision Making





- Diagnostic Testing


Diagnostic test were ordered, analyzed, and reviewed by me: Yes


Radiological Interpretation: Reviewed by me, Discussed w/ radiologist





- Risk of complications


Minimal Risk: Minimal risk of morbidity





- Departure


Departure Disposition: Home


Clinical Impression: 


 Lumbar strain





Condition: Stable


Critical Care Time: No


Referrals: 


[facility] [Primary Care Provider] - Follow up/PCP as directed


Instructions:  Low Back Pain  (DC)


Additional Instructions: 


Rest/Heat/massage


Toradol/Norflex as needed for pain


No lifting over 10 pounds for 5 days


Follow up with your family MD


Return to ER for worsening of condition


Forms:  Work/School Release Form


Prescriptions: 


Orphenadrine Citrate 100 mg*** [Norflex 100 MG Tablet***] 100 mg PO BID PRN PRN 

#14 tab


 PRN Reason: Pain


Ketorolac Trometh 10 mg Tab** [TORAdol 10 MG TABLET***] 10 mg PO TID PRN PRN #10

tablet


 PRN Reason: Pain

## 2024-10-06 ENCOUNTER — HOSPITAL ENCOUNTER (EMERGENCY)
Dept: HOSPITAL 33 - ED | Age: 44
Discharge: HOME | End: 2024-10-06
Payer: COMMERCIAL

## 2024-10-06 VITALS
RESPIRATION RATE: 18 BRPM | OXYGEN SATURATION: 100 % | SYSTOLIC BLOOD PRESSURE: 151 MMHG | DIASTOLIC BLOOD PRESSURE: 82 MMHG | HEART RATE: 98 BPM

## 2024-10-06 VITALS — TEMPERATURE: 98.1 F

## 2024-10-06 DIAGNOSIS — Z72.0: ICD-10-CM

## 2024-10-06 DIAGNOSIS — K08.89: ICD-10-CM

## 2024-10-06 DIAGNOSIS — E11.9: ICD-10-CM

## 2024-10-06 DIAGNOSIS — Z79.891: ICD-10-CM

## 2024-10-06 DIAGNOSIS — K04.7: Primary | ICD-10-CM

## 2024-10-06 DIAGNOSIS — E78.5: ICD-10-CM

## 2024-10-06 PROCEDURE — 99283 EMERGENCY DEPT VISIT LOW MDM: CPT

## 2024-10-06 PROCEDURE — 96372 THER/PROPH/DIAG INJ SC/IM: CPT

## 2024-10-06 PROCEDURE — 41800 DRAINAGE OF GUM LESION: CPT

## 2024-10-06 RX ADMIN — KETOROLAC TROMETHAMINE ONE MG: 30 INJECTION, SOLUTION INTRAMUSCULAR; INTRAVENOUS at 15:10

## 2024-10-06 RX ADMIN — HYDROCODONE BITARTRATE AND ACETAMINOPHEN ONE TAB: 5; 325 TABLET ORAL at 15:46

## 2024-10-06 RX ADMIN — AMOXICILLIN AND CLAVULANATE POTASSIUM ONE MG: 875; 125 TABLET, FILM COATED ORAL at 15:11

## 2024-10-06 NOTE — ERPHSYRPT
- History of Present Illness


Time Seen by Provider: 10/06/24 14:49


Source: patient


Exam Limitations: no limitations


Patient Subjective Stated Complaint: Dental pain


Triage Nursing Assessment: Patient ambulated back to ED and transferred self to 

bed. Patient A+O X 3. Patient's skin pink, warm and dry.  Patient complains of 

abscess to left upper mouth. Patient has decaying tooth on left upper gum with 

abscess above decaying tooth. Patient complains of pain 8/10.


Physician History: 





44 years old female with history of periodontal disease, dental caries with 

multiple eruptions presented in the ER with 2 days history of left upper 

anterior incisor area gum swelling and pain moderate to severe sharp throbbing. 

No fever or chills reported.  Swelling is worsening since yesterday evening.  


Allergies/Adverse Reactions: 








tomato Allergy (Verified 10/06/24 14:23)


   





Home Medications: 








Tramadol HCl 50 mg*** [Ultram 50 mg***] 1 tab PO DAILY 10/06/24 [History]





Hx Tetanus, Diphtheria Vaccination/Date Given: Yes


Hx Influenza Vaccination/Date Given: No


Hx Pneumococcal Vaccination/Date Given: No


Immunizations Up to Date: Yes





Travel Risk





- International Travel


Have you traveled outside of the country in past 3 weeks: No





- Emerging Infectious Disease


Are you exhibiting symptoms associated with any current EIDs: No





- Review of Systems


Constitutional: No Symptoms


Eyes: No Symptoms


Ears, Nose, & Throat: Mouth Swelling, Loose Teeth


Respiratory: No Symptoms


Cardiac: No Symptoms


Musculoskeletal: No Symptoms


Skin: No Symptoms


Neurological: No Symptoms





- Past Medical History


Pertinent Past Medical History: Yes


Neurological History: No Pertinent History


ENT History: No Pertinent History


Cardiac History: High Cholesterol, Other


Respiratory History: No Pertinent History


Endocrine Medical History: Diabetes Type II


Musculoskeletal History: Rheumatoid Arthritis


GI Medical History: No Pertinent History


 History: No Pertinent History


Psycho-Social History: No Pertinent History


Female Reproductive Disorders: No Pertinent History


Other Medical History: "Minor Cardiac Event" , Multiple Broken Noses





- Past Surgical History


Past Surgical History: Yes


Neuro Surgical History: No Pertinent History


Cardiac: No Pertinent History


Respiratory: No Pertinent History


Gastrointestinal: No Pertinent History


Genitourinary: No Pertinent History


Musculoskeletal: No Pertinent History


Female Surgical History: Tubal Ligation


Other Surgical History: oral surgery, Surgical removal of impacted teeth, Ear 

drum cauterization x3 (ages 6-11 years)


Significant Family History: hypertension (mother)





- Female History


Hx Last Menstrual Period: Last month


Hx Pregnant Now: No





- Social History


Smoking Status: Current every day smoker


How long have you smoked: 29 years


Exposure to second hand smoke: Yes


Drug Use: marijuana


Patient Lives Alone: No





- Social Determinants of Health


Will the patient participate in the screening: Yes


Do you worry about a steady place to live?: No


Do you have any problems with any of the following?: No known problems


In the past 12 months,have you had to go without utilities?: No


Transportation Issues: No


Has anyone in your support network made you feel unsafe?: No


Have you or anyone in your house had to go without enough: No





- Nursing Vital Signs


Nursing Vital Signs: 


                               Initial Vital Signs











Temperature  98.1 F   10/06/24 14:27


 


Pulse Rate  107 H  10/06/24 14:27


 


Respiratory Rate  20   10/06/24 14:27


 


Blood Pressure  151/86   10/06/24 14:27


 


O2 Sat by Pulse Oximetry  99   10/06/24 14:27








                                   Pain Scale











Pain Intensity                 8

















- Physical Exam


General Appearance: no apparent distress


Eye Exam: bilateral eye: normal inspection, PERRL, EOMI


Ear Exam: bilateral ear: auricle normal, canal normal, TM normal


Nasal Exam: normal inspection


Throat Exam: pharynx normal, dental tenderness (Dental caries with periodontal 

disease, 2 x 2 cm swelling above left central incisor, positive fluctuation)


Neck Exam: normal inspection, non-tender, supple


Cardiovascular/Respiratory Exam: normal breath sounds, regular rate/rhythm


Neurologic Exam: alert, oriented x 3, cooperative, CNs II-XII nml as tested


Skin Exam: normal color


**SpO2 Interpretation**: normal


SpO2: 99


O2 Delivery: Room Air





Procedures





- Incision and Drainage


Time of Procedure: 15:32


Timeout: Performed


Site: Left upper anterior mouth/gums


Blade Size: other (18-gauge needle)


I & D Procedure: betadine prep


Results: moderate amount pus


Progress: 





Tolerated procedure very well


Ordered Tests: 


Medication Summary














Discontinued Medications














Generic Name Dose Route Start Last Admin





  Trade Name Freq  PRN Reason Stop Dose Admin


 


Hydrocodone Bitart/Acetaminophen  2 tab  10/06/24 15:42  10/06/24 15:46





  Hydrocodone/Apap 5/325 1 Tab Tablet  PO  10/06/24 15:43  2 tab





  STAT ONE   Administration


 


Hydrocodone Bitart/Acetaminophen  Confirm  10/06/24 15:45 





  Hydrocodone/Apap 5/325 1 Tab Tablet  Administered  10/06/24 15:46 





  Dose  





  2 tab  





  .ROUTE  





  .STK-MED ONE  


 


Amoxicillin/Clavulanate Potassium  875 mg  10/06/24 15:02  10/06/24 15:11





  Amox Tr/Potassium Clavulanate 875 Mg Tablet  PO  10/06/24 15:03  875 mg





  STAT ONE   Administration


 


Amoxicillin/Clavulanate Potassium  Confirm  10/06/24 15:10 





  Amox Tr/Potassium Clavulanate 875 Mg Tablet  Administered  10/06/24 15:11 





  Dose  





  875 mg  





  .ROUTE  





  .STK-MED ONE  


 


Ketorolac Tromethamine  30 mg  10/06/24 15:02  10/06/24 15:10





  Ketorolac Tromethamine 30 Mg/Ml Inj  IM  10/06/24 15:03  30 mg





  STAT ONE   Administration


 


Ketorolac Tromethamine  Confirm  10/06/24 15:10 





  Ketorolac Tromethamine 30 Mg/Ml Inj  Administered  10/06/24 15:11 





  Dose  





  30 mg  





  .ROUTE  





  .STK-MED ONE  














- Progress


Progress: improved


Progress Note: 





10/06/24 15:52


44 years old with multiple dental caries and periodontal disease is evaluated 

for left upper gum swelling with positive fluctuation.  No fever.  After 

informed consent needle I&D is done with decompression of the swelling with 

drainage of moderate amount of pus.  Started on Augmentin and given symptomatic 

treatment for pain and here.  Recommended outpatient dental follow-up.  

Discussed signs symptoms of worsening needing return to ER which she seems 

understanding.  Stable for discharge.


Counseled pt/family regarding: diagnosis, need for follow-up





Medical Desision Making





- Risk of complications


The pt has a mod risk of morbidity or mortality based on: Need for prescription 

drug management, Need for minor surgical intervention in patient with know risk 

factors





- Departure


Departure Disposition: Home


Clinical Impression: 


 Dental abscess





Condition: Stable


Critical Care Time: No


Referrals: 


[facility] [Primary Care Provider] - Follow up/PCP as directed


Instructions:  Tooth Abscess (DC)


Additional Instructions: 


Follow-up with your primary care and dentist for reevaluation early next week.  

Take Tylenol/ibuprofen as needed for pain.  Return to ER for increased swelling 

or if develop fever chills, excruciating pain etc.


Prescriptions: 


Ibuprofen 600 mg PO Q6HPRN PRN 10 Days #20 tablet


 PRN Reason: Pain


Amox Tr/Potass Clav. 875 mg*** [Augmentin 875-125 Tablet***] 875 mg PO BID #14 

tablet